# Patient Record
Sex: FEMALE | Race: WHITE | Employment: FULL TIME | URBAN - METROPOLITAN AREA
[De-identification: names, ages, dates, MRNs, and addresses within clinical notes are randomized per-mention and may not be internally consistent; named-entity substitution may affect disease eponyms.]

---

## 2017-04-19 ENCOUNTER — HOSPITAL ENCOUNTER (OUTPATIENT)
Dept: PHARMACY | Age: 47
Setting detail: THERAPIES SERIES
Discharge: HOME | End: 2017-04-19
Payer: COMMERCIAL

## 2017-04-19 LAB
CREAT SERPL-MCNC: 0.84 MG/DL (ref 0.5–0.9)
GFR AFRICAN AMERICAN: >60 ML/MIN
GFR NON-AFRICAN AMERICAN: >60 ML/MIN
GFR SERPL CREATININE-BSD FRML MDRD: NORMAL ML/MIN/{1.73_M2}
GFR SERPL CREATININE-BSD FRML MDRD: NORMAL ML/MIN/{1.73_M2}
HCT VFR BLD CALC: 42.9 % (ref 36–46)
HEMOGLOBIN: 14.4 G/DL (ref 12–16)

## 2017-04-19 PROCEDURE — 82565 ASSAY OF CREATININE: CPT

## 2017-04-19 PROCEDURE — 85014 HEMATOCRIT: CPT

## 2017-04-19 PROCEDURE — 36415 COLL VENOUS BLD VENIPUNCTURE: CPT

## 2017-04-19 PROCEDURE — 85018 HEMOGLOBIN: CPT

## 2017-04-19 PROCEDURE — G0463 HOSPITAL OUTPT CLINIC VISIT: HCPCS

## 2017-04-24 ENCOUNTER — OFFICE VISIT (OUTPATIENT)
Dept: OBGYN CLINIC | Age: 47
End: 2017-04-24
Payer: COMMERCIAL

## 2017-04-24 VITALS
HEIGHT: 62 IN | BODY MASS INDEX: 42.69 KG/M2 | HEART RATE: 78 BPM | DIASTOLIC BLOOD PRESSURE: 84 MMHG | SYSTOLIC BLOOD PRESSURE: 124 MMHG | WEIGHT: 232 LBS

## 2017-04-24 DIAGNOSIS — Z12.39 ENCOUNTER FOR BREAST CANCER SCREENING OTHER THAN MAMMOGRAM: ICD-10-CM

## 2017-04-24 DIAGNOSIS — Z01.419 WELL FEMALE EXAM WITH ROUTINE GYNECOLOGICAL EXAM: Primary | ICD-10-CM

## 2017-04-24 PROCEDURE — 99396 PREV VISIT EST AGE 40-64: CPT | Performed by: NURSE PRACTITIONER

## 2017-04-24 ASSESSMENT — PATIENT HEALTH QUESTIONNAIRE - PHQ9
2. FEELING DOWN, DEPRESSED OR HOPELESS: 0
1. LITTLE INTEREST OR PLEASURE IN DOING THINGS: 0
SUM OF ALL RESPONSES TO PHQ9 QUESTIONS 1 & 2: 0
SUM OF ALL RESPONSES TO PHQ QUESTIONS 1-9: 0

## 2017-07-19 ENCOUNTER — HOSPITAL ENCOUNTER (OUTPATIENT)
Dept: PHARMACY | Age: 47
Setting detail: THERAPIES SERIES
Discharge: HOME | End: 2017-07-19
Payer: COMMERCIAL

## 2017-07-19 DIAGNOSIS — I82.4Z9 DEEP VEIN THROMBOSIS (DVT) OF DISTAL VEIN OF LOWER EXTREMITY, UNSPECIFIED CHRONICITY, UNSPECIFIED LATERALITY (HCC): ICD-10-CM

## 2017-07-19 LAB
CREAT SERPL-MCNC: 0.76 MG/DL (ref 0.5–0.9)
GFR AFRICAN AMERICAN: >60 ML/MIN
GFR NON-AFRICAN AMERICAN: >60 ML/MIN
GFR SERPL CREATININE-BSD FRML MDRD: NORMAL ML/MIN/{1.73_M2}
GFR SERPL CREATININE-BSD FRML MDRD: NORMAL ML/MIN/{1.73_M2}
HCT VFR BLD CALC: 44.8 % (ref 36–46)
HEMOGLOBIN: 15 G/DL (ref 12–16)

## 2017-07-19 PROCEDURE — 99211 OFF/OP EST MAY X REQ PHY/QHP: CPT

## 2017-07-19 PROCEDURE — 36415 COLL VENOUS BLD VENIPUNCTURE: CPT

## 2017-07-19 PROCEDURE — 82565 ASSAY OF CREATININE: CPT

## 2017-07-19 PROCEDURE — 85018 HEMOGLOBIN: CPT

## 2017-07-19 PROCEDURE — 85014 HEMATOCRIT: CPT

## 2017-08-11 ENCOUNTER — HOSPITAL ENCOUNTER (OUTPATIENT)
Dept: WOMENS IMAGING | Age: 47
Discharge: HOME | End: 2017-08-11
Payer: COMMERCIAL

## 2017-08-11 DIAGNOSIS — Z12.39 ENCOUNTER FOR BREAST CANCER SCREENING OTHER THAN MAMMOGRAM: ICD-10-CM

## 2017-08-11 DIAGNOSIS — Z01.419 WELL FEMALE EXAM WITH ROUTINE GYNECOLOGICAL EXAM: ICD-10-CM

## 2017-08-11 PROCEDURE — 77063 BREAST TOMOSYNTHESIS BI: CPT

## 2017-10-18 ENCOUNTER — HOSPITAL ENCOUNTER (OUTPATIENT)
Dept: PHARMACY | Age: 47
Setting detail: THERAPIES SERIES
Discharge: HOME | End: 2017-10-18
Payer: COMMERCIAL

## 2017-10-18 DIAGNOSIS — I82.4Z9 DEEP VEIN THROMBOSIS (DVT) OF DISTAL VEIN OF LOWER EXTREMITY, UNSPECIFIED CHRONICITY, UNSPECIFIED LATERALITY (HCC): ICD-10-CM

## 2017-10-18 LAB
CREAT SERPL-MCNC: 0.76 MG/DL (ref 0.5–0.9)
GFR AFRICAN AMERICAN: >60 ML/MIN
GFR NON-AFRICAN AMERICAN: >60 ML/MIN
GFR SERPL CREATININE-BSD FRML MDRD: NORMAL ML/MIN/{1.73_M2}
GFR SERPL CREATININE-BSD FRML MDRD: NORMAL ML/MIN/{1.73_M2}
HCT VFR BLD CALC: 44.3 % (ref 36–46)
HEMOGLOBIN: 15.3 G/DL (ref 12–16)

## 2017-10-18 PROCEDURE — 85014 HEMATOCRIT: CPT

## 2017-10-18 PROCEDURE — 85018 HEMOGLOBIN: CPT

## 2017-10-18 PROCEDURE — 99211 OFF/OP EST MAY X REQ PHY/QHP: CPT

## 2017-10-18 PROCEDURE — 82565 ASSAY OF CREATININE: CPT

## 2017-10-18 PROCEDURE — 36415 COLL VENOUS BLD VENIPUNCTURE: CPT

## 2018-04-18 ENCOUNTER — HOSPITAL ENCOUNTER (OUTPATIENT)
Age: 48
Discharge: HOME | End: 2018-04-18
Payer: COMMERCIAL

## 2018-04-18 ENCOUNTER — HOSPITAL ENCOUNTER (OUTPATIENT)
Dept: PHARMACY | Age: 48
Setting detail: THERAPIES SERIES
Discharge: HOME | End: 2018-04-18
Payer: COMMERCIAL

## 2018-04-18 LAB
CREAT SERPL-MCNC: 0.87 MG/DL (ref 0.5–0.9)
GFR AFRICAN AMERICAN: >60 ML/MIN
GFR NON-AFRICAN AMERICAN: >60 ML/MIN
GFR SERPL CREATININE-BSD FRML MDRD: NORMAL ML/MIN/{1.73_M2}
GFR SERPL CREATININE-BSD FRML MDRD: NORMAL ML/MIN/{1.73_M2}
HCT VFR BLD CALC: 43.8 % (ref 36–46)
HEMOGLOBIN: 14.7 G/DL (ref 12–16)
MCH RBC QN AUTO: 29.3 PG (ref 26–34)
MCHC RBC AUTO-ENTMCNC: 33.5 G/DL (ref 31–37)
MCV RBC AUTO: 87.5 FL (ref 80–100)
NRBC AUTOMATED: NORMAL PER 100 WBC
PDW BLD-RTO: 13.7 % (ref 11.5–14.9)
PLATELET # BLD: 306 K/UL (ref 150–450)
PMV BLD AUTO: 7.4 FL (ref 6–12)
RBC # BLD: 5.01 M/UL (ref 4–5.2)
WBC # BLD: 8 K/UL (ref 3.5–11)

## 2018-04-18 PROCEDURE — 85027 COMPLETE CBC AUTOMATED: CPT

## 2018-04-18 PROCEDURE — 99211 OFF/OP EST MAY X REQ PHY/QHP: CPT

## 2018-04-18 PROCEDURE — 36415 COLL VENOUS BLD VENIPUNCTURE: CPT

## 2018-04-18 PROCEDURE — 82565 ASSAY OF CREATININE: CPT

## 2018-04-25 ENCOUNTER — OFFICE VISIT (OUTPATIENT)
Dept: OBGYN CLINIC | Age: 48
End: 2018-04-25
Payer: COMMERCIAL

## 2018-04-25 ENCOUNTER — HOSPITAL ENCOUNTER (OUTPATIENT)
Age: 48
Setting detail: SPECIMEN
Discharge: HOME | End: 2018-04-25
Payer: COMMERCIAL

## 2018-04-25 VITALS
SYSTOLIC BLOOD PRESSURE: 112 MMHG | DIASTOLIC BLOOD PRESSURE: 70 MMHG | WEIGHT: 232 LBS | BODY MASS INDEX: 42.69 KG/M2 | HEIGHT: 62 IN

## 2018-04-25 DIAGNOSIS — Z01.419 WELL WOMAN EXAM: Primary | ICD-10-CM

## 2018-04-25 DIAGNOSIS — Z11.51 SPECIAL SCREENING EXAMINATION FOR HUMAN PAPILLOMAVIRUS (HPV): ICD-10-CM

## 2018-04-25 DIAGNOSIS — N89.8 VAGINAL DISCHARGE: ICD-10-CM

## 2018-04-25 DIAGNOSIS — Z12.39 SCREENING FOR BREAST CANCER: ICD-10-CM

## 2018-04-25 PROBLEM — Z98.890 HISTORY OF ENDOMETRIAL ABLATION: Status: ACTIVE | Noted: 2018-04-25

## 2018-04-25 PROCEDURE — 87070 CULTURE OTHR SPECIMN AEROBIC: CPT

## 2018-04-25 PROCEDURE — G0145 SCR C/V CYTO,THINLAYER,RESCR: HCPCS

## 2018-04-25 PROCEDURE — 99396 PREV VISIT EST AGE 40-64: CPT | Performed by: NURSE PRACTITIONER

## 2018-04-25 PROCEDURE — 87624 HPV HI-RISK TYP POOLED RSLT: CPT

## 2018-04-25 PROCEDURE — 86403 PARTICLE AGGLUT ANTBDY SCRN: CPT

## 2018-04-25 ASSESSMENT — PATIENT HEALTH QUESTIONNAIRE - PHQ9
SUM OF ALL RESPONSES TO PHQ QUESTIONS 1-9: 0
2. FEELING DOWN, DEPRESSED OR HOPELESS: 0
1. LITTLE INTEREST OR PLEASURE IN DOING THINGS: 0
SUM OF ALL RESPONSES TO PHQ9 QUESTIONS 1 & 2: 0

## 2018-04-27 LAB
HPV SAMPLE: ABNORMAL
HPV SOURCE: ABNORMAL
HPV, GENOTYPE 16: NOT DETECTED
HPV, GENOTYPE 18: NOT DETECTED
HPV, HIGH RISK OTHER: DETECTED
HPV, INTERPRETATION: ABNORMAL

## 2018-04-28 LAB
CULTURE: ABNORMAL
Lab: ABNORMAL
SPECIMEN DESCRIPTION: ABNORMAL
SPECIMEN DESCRIPTION: ABNORMAL
STATUS: ABNORMAL

## 2018-05-18 LAB — CYTOLOGY REPORT: NORMAL

## 2018-05-23 ENCOUNTER — TELEPHONE (OUTPATIENT)
Dept: OBGYN CLINIC | Age: 48
End: 2018-05-23

## 2018-05-24 ENCOUNTER — TELEPHONE (OUTPATIENT)
Dept: OBGYN CLINIC | Age: 48
End: 2018-05-24

## 2018-05-24 RX ORDER — METRONIDAZOLE 500 MG/1
500 TABLET ORAL 2 TIMES DAILY
Qty: 14 TABLET | Refills: 0 | Status: SHIPPED | OUTPATIENT
Start: 2018-05-24 | End: 2018-05-31

## 2018-08-03 ENCOUNTER — PROCEDURE VISIT (OUTPATIENT)
Dept: OBGYN CLINIC | Age: 48
End: 2018-08-03
Payer: COMMERCIAL

## 2018-08-03 ENCOUNTER — HOSPITAL ENCOUNTER (OUTPATIENT)
Age: 48
Setting detail: SPECIMEN
Discharge: HOME | End: 2018-08-03
Payer: COMMERCIAL

## 2018-08-03 VITALS
WEIGHT: 232 LBS | SYSTOLIC BLOOD PRESSURE: 118 MMHG | DIASTOLIC BLOOD PRESSURE: 82 MMHG | BODY MASS INDEX: 42.69 KG/M2 | HEART RATE: 78 BPM | HEIGHT: 62 IN

## 2018-08-03 DIAGNOSIS — R87.810 ASCUS WITH POSITIVE HIGH RISK HPV CERVICAL: Primary | ICD-10-CM

## 2018-08-03 DIAGNOSIS — R87.610 ATYPICAL SQUAMOUS CELLS OF UNDETERMINED SIGNIFICANCE ON CYTOLOGIC SMEAR OF CERVIX (ASC-US): ICD-10-CM

## 2018-08-03 DIAGNOSIS — R87.610 ASCUS WITH POSITIVE HIGH RISK HPV CERVICAL: Primary | ICD-10-CM

## 2018-08-03 DIAGNOSIS — Z92.29 HX OF LONG TERM USE OF BLOOD THINNERS: ICD-10-CM

## 2018-08-03 PROCEDURE — 88305 TISSUE EXAM BY PATHOLOGIST: CPT

## 2018-08-03 PROCEDURE — 57454 BX/CURETT OF CERVIX W/SCOPE: CPT | Performed by: OBSTETRICS & GYNECOLOGY

## 2018-08-03 NOTE — PROGRESS NOTES
[id number] [facility] Gynecology    COLPOSCOPY PROCEDURE FORM    Chief Complaint:   Chief Complaint   Patient presents with    Procedure         8/3/2018  Maxwell Russell  No LMP recorded. Patient has had an ablation. 53 y.o.  [id number]    Past Medical History:   Diagnosis Date    Atypical squamous cells of undetermined significance on cytologic smear of cervix (ASC-US) +HPV (other) 2018 8/3/2018    DVT (deep venous thrombosis) (HCC)     left leg    Hx: UTI (urinary tract infection)     Phobia-Closed Spaces 2013         Past Surgical History:   Procedure Laterality Date     SECTION      x2    CHOLECYSTECTOMY      ENDOMETRIAL ABLATION  13    [facility]    TUBAL LIGATION      VENA CAVA FILTER PLACEMENT  2013    WISDOM TOOTH EXTRACTION         Family History   Problem Relation Age of Onset    Diabetes Mother     Diabetes Father     Breast Cancer Maternal Aunt         dx after age 48    Diabetes Maternal Uncle     Diabetes Maternal Grandmother     Diabetes Maternal Uncle     Heart Attack Paternal Grandmother     Heart Attack Paternal Grandfather     Cancer Neg Hx     Colon Cancer Neg Hx     Eclampsia Neg Hx     Hypertension Neg Hx     Ovarian Cancer Neg Hx      Labor Neg Hx     Spont Abortions Neg Hx     Stroke Neg Hx        Social History     Social History    Marital status: Single     Spouse name: N/A    Number of children: N/A    Years of education: N/A     Occupational History    Not on file.      Social History Main Topics    Smoking status: Current Every Day Smoker     Packs/day: 0.50     Years: 20.00     Types: Cigarettes    Smokeless tobacco: Never Used      Comment: trying to quit    Alcohol use 0.0 oz/week      Comment: Occassional    Drug use: No    Sexual activity: Not Currently     Partners: Male     Birth control/ protection: Surgical      Comment: tubal     Other Topics Concern    Not on file     Social History Narrative    No narrative on file       Current Outpatient Prescriptions on File Prior to Visit   Medication Sig Dispense Refill    apixaban (ELIQUIS) 2.5 MG TABS tablet Take 2.5 mg by mouth 2 times daily       No current facility-administered medications on file prior to visit. Allergies as of 08/03/2018    (No Known Allergies)           INDICATIONS:   1. ASCUS with positive high risk HPV cervical    2. Atypical squamous cells of undetermined significance on cytologic smear of cervix (ASC-US) +HPV (other) 4/2018    3. Hx of long term use of blood thinners                   UHCG: negative         HPV:   +Other (Negative 16 & 18)      Birth Control Method: TL  Abnormal Cytology and Colposcopy History: NONE    COLPOSCOPIC EXAMINATION:                Blood pressure 118/82, pulse 78, height 5' 2\" (1.575 m), weight 232 lb (105.2 kg), not currently breastfeeding. Gross observations: negative  Satisfactory: No   Unsatisfactory: Yes              LANDMARKS and ATYPICAL FINDINGS:  TZ = transformation zone   SC = new squamocolumnar junction  NC = Nabothian cyst    ME = immature squamous metaplasia  PO = polyp   AV = atypical vessels  C = condyloma  L = leukoplakia  AW = acetowhite epithelium   P = punctation  MO = mosaicism   LS = decreased Lugols uptake  X = biopsy sites  CA = invasive carcinoma      FINDINGS: The colposcope was utilized on high and low magnification. A plain white light and green filter were  also implemented after 3% of acetic acid was applied to the cervix. There was no Aceto White Epithelium, Mosaic Changes, Punctation, or Irregular Vessels seen. ECC performed:  Yes    Lugols Iodine Applied:   No       IMPRESSIONS: Negative  Biopsy sites: ECC      Assessment:   Diagnosis Orders   1. ASCUS with positive high risk HPV cervical  [place] COLPOSC,CERVIX W/ADJ VAG,W/BX & CURRETAG    Specimen to Pathology Outpatient   2.  Atypical squamous cells of undetermined significance on cytologic smear of cervix (ASC-US) +HPV (other) 4/2018     3. Hx of long term use of blood thinners           PLAN:   1. The patient was given formal restriction guidelines. She is to RTO in 2 weeks. FOR PATIENTS WHO UNDERWENT A BIOPSY-They were instructed to report         any increase in vaginal bleeding, discharge, or any temperature more than      100.4   F. Strict pelvic rest precautions were reviewed with lifting restrictions. 2. If WNL Histopathology or Negative Colposcopic evaluation without Biopsies           and/or ECC then Cytologic Collection/PAP in 6 months. 3. HPV Barrier Recommendations and STD counseling completed    Return in about 2 months (around 10/3/2018) for PAP.       Latanya Holley, DO

## 2018-08-08 LAB — SURGICAL PATHOLOGY REPORT: NORMAL

## 2018-08-13 ENCOUNTER — HOSPITAL ENCOUNTER (OUTPATIENT)
Dept: WOMENS IMAGING | Age: 48
Discharge: HOME | End: 2018-08-15
Payer: COMMERCIAL

## 2018-08-13 DIAGNOSIS — Z12.39 SCREENING FOR BREAST CANCER: ICD-10-CM

## 2018-08-13 PROCEDURE — 77063 BREAST TOMOSYNTHESIS BI: CPT

## 2018-10-05 ENCOUNTER — HOSPITAL ENCOUNTER (OUTPATIENT)
Age: 48
Setting detail: SPECIMEN
Discharge: HOME | End: 2018-10-05
Payer: COMMERCIAL

## 2018-10-05 ENCOUNTER — OFFICE VISIT (OUTPATIENT)
Dept: OBGYN CLINIC | Age: 48
End: 2018-10-05
Payer: COMMERCIAL

## 2018-10-05 VITALS
HEIGHT: 62 IN | WEIGHT: 232 LBS | SYSTOLIC BLOOD PRESSURE: 112 MMHG | HEART RATE: 76 BPM | DIASTOLIC BLOOD PRESSURE: 74 MMHG | BODY MASS INDEX: 42.69 KG/M2

## 2018-10-05 DIAGNOSIS — R87.810 ASCUS WITH POSITIVE HIGH RISK HPV CERVICAL: ICD-10-CM

## 2018-10-05 DIAGNOSIS — Z11.51 SPECIAL SCREENING EXAMINATION FOR HUMAN PAPILLOMAVIRUS (HPV): ICD-10-CM

## 2018-10-05 DIAGNOSIS — Z11.51 SPECIAL SCREENING EXAMINATION FOR HUMAN PAPILLOMAVIRUS (HPV): Primary | ICD-10-CM

## 2018-10-05 DIAGNOSIS — R87.610 ASCUS WITH POSITIVE HIGH RISK HPV CERVICAL: ICD-10-CM

## 2018-10-05 DIAGNOSIS — Z01.419 WELL WOMAN EXAM: ICD-10-CM

## 2018-10-05 PROCEDURE — 88175 CYTOPATH C/V AUTO FLUID REDO: CPT

## 2018-10-05 PROCEDURE — 99213 OFFICE O/P EST LOW 20 MIN: CPT | Performed by: NURSE PRACTITIONER

## 2018-10-05 PROCEDURE — 87624 HPV HI-RISK TYP POOLED RSLT: CPT

## 2018-10-17 ENCOUNTER — HOSPITAL ENCOUNTER (OUTPATIENT)
Dept: PHARMACY | Age: 48
Setting detail: THERAPIES SERIES
Discharge: HOME | End: 2018-10-17
Payer: COMMERCIAL

## 2018-10-17 ENCOUNTER — HOSPITAL ENCOUNTER (OUTPATIENT)
Age: 48
Setting detail: SPECIMEN
Discharge: HOME | End: 2018-10-17
Payer: COMMERCIAL

## 2018-10-17 ENCOUNTER — TELEPHONE (OUTPATIENT)
Dept: PHARMACY | Age: 48
End: 2018-10-17

## 2018-10-17 DIAGNOSIS — I82.403 DEEP VEIN THROMBOSIS (DVT) OF BOTH LOWER EXTREMITIES, UNSPECIFIED CHRONICITY, UNSPECIFIED VEIN (HCC): ICD-10-CM

## 2018-10-17 LAB
HCT VFR BLD CALC: 45.3 % (ref 36–46)
HEMOGLOBIN: 15 G/DL (ref 12–16)

## 2018-10-17 PROCEDURE — 85018 HEMOGLOBIN: CPT

## 2018-10-17 PROCEDURE — 36415 COLL VENOUS BLD VENIPUNCTURE: CPT

## 2018-10-17 PROCEDURE — 99212 OFFICE O/P EST SF 10 MIN: CPT

## 2018-10-17 PROCEDURE — 85014 HEMATOCRIT: CPT

## 2018-10-23 LAB — CYTOLOGY REPORT: NORMAL

## 2018-10-25 ENCOUNTER — TELEPHONE (OUTPATIENT)
Dept: OBGYN CLINIC | Age: 48
End: 2018-10-25

## 2018-10-25 DIAGNOSIS — R87.618 UNEXPLAINED ENDOMETRIAL CELLS ON CERVICAL PAP SMEAR: Primary | ICD-10-CM

## 2018-11-05 ENCOUNTER — OFFICE VISIT (OUTPATIENT)
Dept: OBGYN CLINIC | Age: 48
End: 2018-11-05

## 2018-11-05 DIAGNOSIS — R87.618 UNEXPLAINED ENDOMETRIAL CELLS ON CERVICAL PAP SMEAR: ICD-10-CM

## 2018-11-05 DIAGNOSIS — R87.618 UNEXPLAINED ENDOMETRIAL CELLS ON CERVICAL PAP SMEAR: Primary | ICD-10-CM

## 2018-11-05 PROCEDURE — 76856 US EXAM PELVIC COMPLETE: CPT | Performed by: OBSTETRICS & GYNECOLOGY

## 2018-11-05 PROCEDURE — 76830 TRANSVAGINAL US NON-OB: CPT | Performed by: OBSTETRICS & GYNECOLOGY

## 2018-12-06 ENCOUNTER — PROCEDURE VISIT (OUTPATIENT)
Dept: OBGYN CLINIC | Age: 48
End: 2018-12-06
Payer: COMMERCIAL

## 2018-12-06 ENCOUNTER — HOSPITAL ENCOUNTER (OUTPATIENT)
Age: 48
Discharge: HOME | End: 2018-12-06
Payer: COMMERCIAL

## 2018-12-06 VITALS
BODY MASS INDEX: 42.69 KG/M2 | SYSTOLIC BLOOD PRESSURE: 116 MMHG | WEIGHT: 232 LBS | DIASTOLIC BLOOD PRESSURE: 80 MMHG | HEART RATE: 78 BPM | HEIGHT: 62 IN

## 2018-12-06 DIAGNOSIS — N95.1 PERIMENOPAUSAL SYMPTOMS: ICD-10-CM

## 2018-12-06 DIAGNOSIS — Z32.02 NEGATIVE PREGNANCY TEST: ICD-10-CM

## 2018-12-06 DIAGNOSIS — R87.618 UNEXPLAINED ENDOMETRIAL CELLS ON CERVICAL PAP SMEAR: ICD-10-CM

## 2018-12-06 DIAGNOSIS — R87.810 ASCUS WITH POSITIVE HIGH RISK HPV CERVICAL: Primary | ICD-10-CM

## 2018-12-06 DIAGNOSIS — R87.610 ASCUS WITH POSITIVE HIGH RISK HPV CERVICAL: Primary | ICD-10-CM

## 2018-12-06 LAB
ABSOLUTE EOS #: 0.2 K/UL (ref 0–0.4)
ABSOLUTE IMMATURE GRANULOCYTE: NORMAL K/UL (ref 0–0.3)
ABSOLUTE LYMPH #: 2.1 K/UL (ref 1–4.8)
ABSOLUTE MONO #: 0.6 K/UL (ref 0.1–1.3)
BASOPHILS # BLD: 1 % (ref 0–2)
BASOPHILS ABSOLUTE: 0.1 K/UL (ref 0–0.2)
CONTROL: NORMAL
DIFFERENTIAL TYPE: NORMAL
EOSINOPHILS RELATIVE PERCENT: 2 % (ref 0–4)
ESTRADIOL LEVEL: 461 PG/ML (ref 27–314)
FOLLICLE STIMULATING HORMONE: 15.8 U/L (ref 1.7–21.5)
HCT VFR BLD CALC: 44 % (ref 36–46)
HEMOGLOBIN: 14.9 G/DL (ref 12–16)
IMMATURE GRANULOCYTES: NORMAL %
LYMPHOCYTES # BLD: 24 % (ref 24–44)
MCH RBC QN AUTO: 29.5 PG (ref 26–34)
MCHC RBC AUTO-ENTMCNC: 33.9 G/DL (ref 31–37)
MCV RBC AUTO: 87 FL (ref 80–100)
MONOCYTES # BLD: 7 % (ref 1–7)
NRBC AUTOMATED: NORMAL PER 100 WBC
PDW BLD-RTO: 13.7 % (ref 11.5–14.9)
PLATELET # BLD: 304 K/UL (ref 150–450)
PLATELET ESTIMATE: NORMAL
PMV BLD AUTO: 8.7 FL (ref 6–12)
PREGNANCY TEST URINE, POC: NEGATIVE
RBC # BLD: 5.05 M/UL (ref 4–5.2)
RBC # BLD: NORMAL 10*6/UL
SEG NEUTROPHILS: 66 % (ref 36–66)
SEGMENTED NEUTROPHILS ABSOLUTE COUNT: 5.7 K/UL (ref 1.3–9.1)
TSH SERPL DL<=0.05 MIU/L-ACNC: 1.24 MIU/L (ref 0.3–5)
WBC # BLD: 8.6 K/UL (ref 3.5–11)
WBC # BLD: NORMAL 10*3/UL

## 2018-12-06 PROCEDURE — 83001 ASSAY OF GONADOTROPIN (FSH): CPT

## 2018-12-06 PROCEDURE — 84443 ASSAY THYROID STIM HORMONE: CPT

## 2018-12-06 PROCEDURE — 57456 ENDOCERV CURETTAGE W/SCOPE: CPT | Performed by: OBSTETRICS & GYNECOLOGY

## 2018-12-06 PROCEDURE — 81025 URINE PREGNANCY TEST: CPT | Performed by: OBSTETRICS & GYNECOLOGY

## 2018-12-06 PROCEDURE — 36415 COLL VENOUS BLD VENIPUNCTURE: CPT

## 2018-12-06 PROCEDURE — 82670 ASSAY OF TOTAL ESTRADIOL: CPT

## 2018-12-06 PROCEDURE — 85025 COMPLETE CBC W/AUTO DIFF WBC: CPT

## 2018-12-06 PROCEDURE — 88305 TISSUE EXAM BY PATHOLOGIST: CPT

## 2018-12-06 NOTE — PROGRESS NOTES
[id number] [facility] Gynecology    COLPOSCOPY PROCEDURE FORM    Chief Complaint:   Chief Complaint   Patient presents with    Procedure         2018  Elva Russell  No LMP recorded. Patient has had an ablation. 53 y.o.  [id number]    Past Medical History:   Diagnosis Date    Atypical squamous cells of undetermined significance on cytologic smear of cervix (ASC-US) +HPV (other) 2018 8/3/2018    DVT (deep venous thrombosis) (HCC)     left leg    Hx: UTI (urinary tract infection)     Phobia-Closed Spaces 2013         Past Surgical History:   Procedure Laterality Date     SECTION      x2    CHOLECYSTECTOMY      ENDOMETRIAL ABLATION  13    [facility]    TUBAL LIGATION      VENA CAVA FILTER PLACEMENT  2013    WISDOM TOOTH EXTRACTION         Family History   Problem Relation Age of Onset    Diabetes Mother     Diabetes Father     Breast Cancer Maternal Aunt         dx after age 48    Diabetes Maternal Uncle     Diabetes Maternal Grandmother     Diabetes Maternal Uncle     Heart Attack Paternal Grandmother     Heart Attack Paternal Grandfather     Cancer Neg Hx     Colon Cancer Neg Hx     Eclampsia Neg Hx     Hypertension Neg Hx     Ovarian Cancer Neg Hx      Labor Neg Hx     Spont Abortions Neg Hx     Stroke Neg Hx        Social History     Social History    Marital status: Single     Spouse name: N/A    Number of children: N/A    Years of education: N/A     Occupational History    Not on file.      Social History Main Topics    Smoking status: Current Every Day Smoker     Packs/day: 0.50     Years: 20.00     Types: Cigarettes    Smokeless tobacco: Never Used      Comment: trying to quit    Alcohol use 0.0 oz/week      Comment: Occassional    Drug use: No    Sexual activity: Not Currently     Partners: Male     Birth control/ protection: Surgical      Comment: tubal     Other Topics Concern    Not on file     Social History Narrative

## 2018-12-10 LAB — SURGICAL PATHOLOGY REPORT: NORMAL

## 2018-12-13 ENCOUNTER — TELEPHONE (OUTPATIENT)
Dept: OBGYN CLINIC | Age: 48
End: 2018-12-13

## 2019-01-02 ENCOUNTER — TELEPHONE (OUTPATIENT)
Dept: OBGYN CLINIC | Age: 49
End: 2019-01-02

## 2019-01-10 ENCOUNTER — HOSPITAL ENCOUNTER (OUTPATIENT)
Age: 49
Setting detail: SPECIMEN
Discharge: HOME | End: 2019-01-10
Payer: COMMERCIAL

## 2019-01-10 ENCOUNTER — PROCEDURE VISIT (OUTPATIENT)
Dept: OBGYN CLINIC | Age: 49
End: 2019-01-10
Payer: COMMERCIAL

## 2019-01-10 VITALS
BODY MASS INDEX: 42.69 KG/M2 | DIASTOLIC BLOOD PRESSURE: 80 MMHG | HEIGHT: 62 IN | SYSTOLIC BLOOD PRESSURE: 116 MMHG | HEART RATE: 78 BPM | WEIGHT: 232 LBS

## 2019-01-10 DIAGNOSIS — N94.6 DYSMENORRHEA: ICD-10-CM

## 2019-01-10 DIAGNOSIS — Z32.02 NEGATIVE PREGNANCY TEST: ICD-10-CM

## 2019-01-10 DIAGNOSIS — D68.59 ANTITHROMBIN 3 DEFICIENCY (HCC): ICD-10-CM

## 2019-01-10 DIAGNOSIS — R87.810 ASCUS WITH POSITIVE HIGH RISK HPV CERVICAL: ICD-10-CM

## 2019-01-10 DIAGNOSIS — R87.618 UNEXPLAINED ENDOMETRIAL CELLS ON CERVICAL PAP SMEAR: Primary | ICD-10-CM

## 2019-01-10 DIAGNOSIS — I82.403 DEEP VEIN THROMBOSIS (DVT) OF BOTH LOWER EXTREMITIES, UNSPECIFIED CHRONICITY, UNSPECIFIED VEIN (HCC): ICD-10-CM

## 2019-01-10 DIAGNOSIS — R87.610 ASCUS WITH POSITIVE HIGH RISK HPV CERVICAL: ICD-10-CM

## 2019-01-10 DIAGNOSIS — Z92.29 HX OF LONG TERM USE OF BLOOD THINNERS: ICD-10-CM

## 2019-01-10 DIAGNOSIS — N88.2 CERVICAL STENOSIS (UTERINE CERVIX): ICD-10-CM

## 2019-01-10 DIAGNOSIS — Z95.828 GREENFIELD FILTER IN PLACE: ICD-10-CM

## 2019-01-10 DIAGNOSIS — N89.8 VAGINAL DISCHARGE: ICD-10-CM

## 2019-01-10 LAB
CONTROL: NORMAL
PREGNANCY TEST URINE, POC: NEGATIVE

## 2019-01-10 PROCEDURE — 58558 HYSTEROSCOPY BIOPSY: CPT | Performed by: OBSTETRICS & GYNECOLOGY

## 2019-01-10 PROCEDURE — 87070 CULTURE OTHR SPECIMN AEROBIC: CPT

## 2019-01-10 PROCEDURE — 88305 TISSUE EXAM BY PATHOLOGIST: CPT

## 2019-01-10 PROCEDURE — 81025 URINE PREGNANCY TEST: CPT | Performed by: OBSTETRICS & GYNECOLOGY

## 2019-01-14 LAB — SURGICAL PATHOLOGY REPORT: NORMAL

## 2019-02-26 ENCOUNTER — TELEPHONE (OUTPATIENT)
Dept: OBGYN CLINIC | Age: 49
End: 2019-02-26

## 2019-04-17 ENCOUNTER — HOSPITAL ENCOUNTER (OUTPATIENT)
Dept: PHARMACY | Age: 49
Setting detail: THERAPIES SERIES
Discharge: HOME | End: 2019-04-17
Payer: COMMERCIAL

## 2019-04-17 DIAGNOSIS — I82.403 DEEP VEIN THROMBOSIS (DVT) OF BOTH LOWER EXTREMITIES, UNSPECIFIED CHRONICITY, UNSPECIFIED VEIN (HCC): ICD-10-CM

## 2019-04-17 LAB
CREAT SERPL-MCNC: 0.81 MG/DL (ref 0.5–0.9)
GFR AFRICAN AMERICAN: >60 ML/MIN
GFR NON-AFRICAN AMERICAN: >60 ML/MIN
GFR SERPL CREATININE-BSD FRML MDRD: NORMAL ML/MIN/{1.73_M2}
GFR SERPL CREATININE-BSD FRML MDRD: NORMAL ML/MIN/{1.73_M2}
HCT VFR BLD CALC: 43.9 % (ref 36–46)
HEMOGLOBIN: 14.5 G/DL (ref 12–16)
MCH RBC QN AUTO: 28.9 PG (ref 26–34)
MCHC RBC AUTO-ENTMCNC: 33.1 G/DL (ref 31–37)
MCV RBC AUTO: 87.5 FL (ref 80–100)
NRBC AUTOMATED: NORMAL PER 100 WBC
PDW BLD-RTO: 13.8 % (ref 11.5–14.9)
PLATELET # BLD: 294 K/UL (ref 150–450)
PMV BLD AUTO: 7.8 FL (ref 6–12)
RBC # BLD: 5.02 M/UL (ref 4–5.2)
WBC # BLD: 7.1 K/UL (ref 3.5–11)

## 2019-04-17 PROCEDURE — 85027 COMPLETE CBC AUTOMATED: CPT

## 2019-04-17 PROCEDURE — 82565 ASSAY OF CREATININE: CPT

## 2019-04-17 PROCEDURE — 99212 OFFICE O/P EST SF 10 MIN: CPT

## 2019-04-17 NOTE — PROGRESS NOTES
Apixaban (Eliquis Management)  Indication of Therapy: Reduction in risk of recurrent DVT/PE  Prescribed Eliquis Dose: 2.5 mg Twice daily    Age 53  Srcr 0.96 on 9-  HBG  15  On 10-  HCT   45.3  On 10-    Dose Appropriate: Yes Patient is taking every 12 hours with phone reminder    Current Meds Reviewed  Yes - patient has started Saxenda (liraglutide) for weight loss. Drug Interactions   None  Patient taking medications as prescribed   Yes    Counseling Points:  1. Indication for Apixaban  2. Explanation of apixaban including purpose and mechanism of action  3. Importance of Compliance  4. Dose and directions  5. Side Effects  6. Potential Drug Interactions  7. Signs/symptoms of VTE and stroke  8. Contact prescriber when:     A. Changes made to other medications     B. Signs/Symptoms of VTE/Stroke     C. Uncontrolled bleeding or unusual bruising  9. Discussed smoking cessation. Explained stepped down approach to smoking cessation as opposed to quick stop. Also discussed Chantix and nicotine replacement as well as use of nicotine gum in addition to nicotine patch or Chantix for cravings. Offered help in finding copay help if Chantix is too costly.     Next Appt scheduled in 6 months  CBC, H&H, SCr ordered

## 2019-04-25 ENCOUNTER — OFFICE VISIT (OUTPATIENT)
Dept: OBGYN CLINIC | Age: 49
End: 2019-04-25
Payer: COMMERCIAL

## 2019-04-25 ENCOUNTER — HOSPITAL ENCOUNTER (OUTPATIENT)
Age: 49
Setting detail: SPECIMEN
Discharge: HOME | End: 2019-04-25
Payer: COMMERCIAL

## 2019-04-25 VITALS
DIASTOLIC BLOOD PRESSURE: 70 MMHG | WEIGHT: 210 LBS | BODY MASS INDEX: 38.64 KG/M2 | RESPIRATION RATE: 18 BRPM | HEIGHT: 62 IN | SYSTOLIC BLOOD PRESSURE: 124 MMHG

## 2019-04-25 DIAGNOSIS — Z01.419 WELL WOMAN EXAM WITH ROUTINE GYNECOLOGICAL EXAM: ICD-10-CM

## 2019-04-25 DIAGNOSIS — Z12.39 SCREENING FOR BREAST CANCER: ICD-10-CM

## 2019-04-25 DIAGNOSIS — Z01.419 WELL WOMAN EXAM WITH ROUTINE GYNECOLOGICAL EXAM: Primary | ICD-10-CM

## 2019-04-25 PROCEDURE — 99396 PREV VISIT EST AGE 40-64: CPT | Performed by: NURSE PRACTITIONER

## 2019-04-25 PROCEDURE — 87624 HPV HI-RISK TYP POOLED RSLT: CPT

## 2019-04-25 PROCEDURE — G0145 SCR C/V CYTO,THINLAYER,RESCR: HCPCS

## 2019-04-25 ASSESSMENT — PATIENT HEALTH QUESTIONNAIRE - PHQ9
SUM OF ALL RESPONSES TO PHQ QUESTIONS 1-9: 0
2. FEELING DOWN, DEPRESSED OR HOPELESS: 0
SUM OF ALL RESPONSES TO PHQ QUESTIONS 1-9: 0
1. LITTLE INTEREST OR PLEASURE IN DOING THINGS: 0
SUM OF ALL RESPONSES TO PHQ9 QUESTIONS 1 & 2: 0

## 2019-04-25 NOTE — PROGRESS NOTES
History and Physical  [address], [address] [address], [address] [place] [phone]   Fax [phone]  Theron Jeffery  2019              53 y.o. Chief Complaint   Patient presents with    Annual Exam       No LMP recorded (lmp unknown). Patient has had an ablation. Primary Care Physician: YAIMA Gay - CNP    The patient was seen and examined. She has no chief complaint today and is here for her annual exam.  Her bowels are regular. There are no voiding complaints. She denies any bloating. She denies vaginal discharge and was counseled on STD's and the need for barrier contraception.      HPI : Theron Jeffery is a 53 y.o. female [id number]    Annual exam  No chief complaint  Hx of endometrial ablation-   ________________________________________________________________________  OB History    Para Term  AB Living   4 4 4 0 0 4   SAB TAB Ectopic Molar Multiple Live Births   0 0 0 0 0 4      # Outcome Date GA Lbr Kush/2nd Weight Sex Delivery Anes PTL Lv   4 Term     F    KUNAL   3 Term     F    KUNAL   2 Term     M CS-LTranv   KUNAL   1 Term     F CS-LTranv   KUNAL     Past Medical History:   Diagnosis Date    Atypical squamous cells of undetermined significance on cytologic smear of cervix (ASC-US) +HPV (other) 2018 8/3/2018    DVT [facility]deep venous thrombosis) ([facility])     left leg    Hx: UTI (urinary tract infection)     Phobia-Closed Spaces 2013                                                                   Past Surgical History:   Procedure Laterality Date     SECTION      x2    CHOLECYSTECTOMY      ENDOMETRIAL ABLATION  13    [facility]    TUBAL LIGATION      VENA CAVA FILTER PLACEMENT  2013    WISDOM TOOTH EXTRACTION       Family History   Problem Relation Age of Onset    Diabetes Mother     Diabetes Father     Breast Cancer Maternal Aunt         dx after age 48    Diabetes Maternal Uncle     Diabetes Maternal Grandmother     Diabetes Maternal Uncle     Heart Attack Paternal Grandmother     Heart Attack Paternal Grandfather     Cancer Neg Hx     Colon Cancer Neg Hx     Eclampsia Neg Hx     Hypertension Neg Hx     Ovarian Cancer Neg Hx      Labor Neg Hx     Spont Abortions Neg Hx     Stroke Neg Hx      Social History     Socioeconomic History    Marital status: Single     Spouse name: Not on file    Number of children: Not on file    Years of education: Not on file    Highest education level: Not on file   Occupational History    Not on file   Social Needs    Financial resource strain: Not on file    Food insecurity:     Worry: Not on file     Inability: Not on file    Transportation needs:     Medical: Not on file     Non-medical: Not on file   Tobacco Use    Smoking status: Current Every Day Smoker     Packs/day: 0.50     Years: 20.00     Pack years: 10.00     Types: Cigarettes    Smokeless tobacco: Never Used    Tobacco comment: trying to quit   Substance and Sexual Activity    Alcohol use:  Yes     Alcohol/week: 0.0 oz     Comment: Occassional    Drug use: No    Sexual activity: Not Currently     Partners: Male     Birth control/protection: Surgical     Comment: tubal   Lifestyle    Physical activity:     Days per week: Not on file     Minutes per session: Not on file    Stress: Not on file   Relationships    Social connections:     Talks on phone: Not on file     Gets together: Not on file     Attends Christian service: Not on file     Active member of club or organization: Not on file     Attends meetings of clubs or organizations: Not on file     Relationship status: Not on file    Intimate partner violence:     Fear of current or ex partner: Not on file     Emotionally abused: Not on file     Physically abused: Not on file     Forced sexual activity: Not on file   Other Topics Concern    Not on file   Social History Narrative    Not on file [Shotty] [Tender] [Enlarged]     Neck and EENT:  The neck was supple. There were no masses   The thyroid was not enlarged and had no masses. Perrla, EOMI B/L, TMI B/L No Abnormalities. Throat inspected-No exudates or Masses, Nares Patent No Masses        Respiratory: The lungs were auscultated and found to be clear. There were no rales, rhonchi or wheezes. There was a good respiratory effort. Cardiovascular: The heart was in a regular rate and rhythm. . No S3 or S4. There was no murmur appreciated. Location, grade, and radiation are not applicable. Extremities: The patients extremities were without calf tenderness, edema, or varicosities. There was full range of motion in all four extremities. Pulses in all four extremities were appreciated and are 2/4. Abdomen: The abdomen was soft and non-tender. There were good bowel sounds in all quadrants and there was no guarding, rebound or rigidity. On evaluation there was no evidence of hepatosplenomegaly and there was no costal vertebral alison tenderness bilaterally. No hernias were appreciated. Abdominal Scars: C/S scar and TL scar, cholecystectomy scar    Psych: The patient had a normal Orientation to: Time, Place, Person, and Situation  There is no Mood / Affect changes    Breast:  (Chest)  normal appearance, no masses or tenderness  Self breast exams were reviewed in detail. Literature was given. Pelvic Exam:  Vulva and vagina appear normal. Bimanual exam reveals normal uterus and adnexa. Rectal Exam:  exam declined by patient          Musculosk:  Normal Gait and station was noted. Digits were evaluated without abnormal findings. Range of motion, stability and strength were evaluated and found to be appropriate for the patients age. ASSESSMENT:      53 y.o. Annual   Diagnosis Orders   1. Well woman exam with routine gynecological exam  PAP SMEAR   2.  Screening for breast cancer  GUMARO DIGITAL SCREEN W CAD BILATERAL Chief Complaint   Patient presents with    Annual Exam          Past Medical History:   Diagnosis Date    Atypical squamous cells of undetermined significance on cytologic smear of cervix (ASC-US) +HPV (other) 4/2018 8/3/2018    DVT (deep venous thrombosis) (HCC)     left leg    Hx: UTI (urinary tract infection)     Phobia-Closed Spaces 5/28/2013         Patient Active Problem List    Diagnosis Date Noted    ASCUS with positive high risk HPV cervical 08/03/2018     Priority: High     8/2018 Colposcopy completed with ECC      Hx of long term use of blood thinners (Eloquis) 08/03/2018     Priority: High    Heavy menstrual bleeding due to blood thinners 08/08/2013     Priority: High    Dysmenorrhea 08/08/2013     Priority: High    Thickened endometrium 06/24/2013     Priority: High     Endometrial Sampling and hysteroscopy 7/2013 (Negative Atypia or Hyperplasia)      Enlarged uterus 06/24/2013     Priority: High    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 Tobacco & Secondary smoke risks reviewed; instructed on cessation and avoidance      Counseling Completed:  Preventative Health Recommendations and Follow up. The patient was informed of the recommended preventative health recommendations. 1. Annuals every year; Cytology collections per prevailing guidelines.    2. Mammograms begin every year at 35 yo if no abnormalities are found and no family     History. 3. Bone density studies every 2-3 years. Begin at 71 yo. If no fracture history or osteoporosis family history. (significant). 4. Colonoscopy begin at 38 yo. Repeat every ten years if negative and no family history. 5. Calcium of 1639-2060 mg/day in split dosing  6. Vitamin D 400-800 IU/day  7. All other preventative health recommendations will be managed by the patients Primary care physician. PLAN:  Return in about 6 months (around 10/25/2019) for 6 month pap smear. Pap smear collected. Mammogram ordered. Repeat Annual every 1 year  Cervical Cytology Evaluation begins at 24years old. If Negative Cytology, Follow-up screening per current guidelines. Mammograms every 1 year. If 35 yo and last mammogram was negative. Calcium and Vitamin D dosing reviewed. Colonoscopy screening reviewed as well as onset for bone density testing. Birth control and barrier recommendations discussed. STD counseling and prevention reviewed. Gardisil counseling completed for all patients 10-35 yo. Routine health maintenance per patients PCP. Orders Placed This Encounter   Procedures    GUMARO DIGITAL SCREEN W CAD BILATERAL     Standing Status:   Future     Standing Expiration Date:   2020     Order Specific Question:   Reason for exam:     Answer:   SCREENING    PAP SMEAR     Patient History:    No LMP recorded (lmp unknown). Patient has had an ablation.   OBGYN Status: Ablation  Past Surgical History:  No date:  SECTION      Comment:  x2  : CHOLECYSTECTOMY  13: ENDOMETRIAL ABLATION      Comment:  Maday Isabel  No date: TUBAL LIGATION  2013: VENA CAVA FILTER PLACEMENT  No date: WISDOM TOOTH EXTRACTION      Social History    Tobacco Use      Smoking status: Current Every Day Smoker        Packs/day: 0.50        Years: 20.00        Pack years: 10        Types: Cigarettes      Smokeless tobacco: Never Used      Tobacco comment: trying to quit       Standing Status:   Future     Number of Occurrences:   1     Standing Expiration Date:   4/24/2020     Order Specific Question:   Collection Type     Answer: Thin Prep     Order Specific Question:   Prior Abnormal Pap Test     Answer:   No     Order Specific Question:   Screening or Diagnostic     Answer:   Screening     Order Specific Question:   HPV Requested?      Answer:   Yes     Order Specific Question:   High Risk Patient     Answer:   N/A

## 2019-04-29 LAB
HPV SAMPLE: ABNORMAL
HPV, GENOTYPE 16: NOT DETECTED
HPV, GENOTYPE 18: NOT DETECTED
HPV, HIGH RISK OTHER: DETECTED
HPV, INTERPRETATION: ABNORMAL
SPECIMEN DESCRIPTION: ABNORMAL

## 2019-05-01 LAB — CYTOLOGY REPORT: NORMAL

## 2019-05-02 ENCOUNTER — TELEPHONE (OUTPATIENT)
Dept: OBGYN CLINIC | Age: 49
End: 2019-05-02

## 2019-05-02 NOTE — TELEPHONE ENCOUNTER
Reviewed results with Francisco Rosario, he recommend patient have a 6 month pap smear completed, patient notified of results and recommendations. Patient is scheduled for 6 month pap smear to be completed in October.

## 2019-05-02 NOTE — TELEPHONE ENCOUNTER
----- Message from YAIMA Demarco CNP sent at 5/1/2019  5:09 PM EDT -----  Pap smear neg  + HPV detected  AGE 53  Had colposcopy with DR Jackie Irene 12/2018  Please check with Dr Jackie Irene if she need another colposcopy

## 2019-08-15 ENCOUNTER — HOSPITAL ENCOUNTER (OUTPATIENT)
Dept: WOMENS IMAGING | Age: 49
Discharge: HOME | End: 2019-08-17
Payer: COMMERCIAL

## 2019-08-15 DIAGNOSIS — Z12.39 SCREENING FOR BREAST CANCER: ICD-10-CM

## 2019-08-15 PROCEDURE — 77063 BREAST TOMOSYNTHESIS BI: CPT

## 2019-10-23 ENCOUNTER — HOSPITAL ENCOUNTER (OUTPATIENT)
Dept: PHARMACY | Age: 49
Setting detail: THERAPIES SERIES
Discharge: HOME | End: 2019-10-23
Payer: COMMERCIAL

## 2019-10-23 ENCOUNTER — OFFICE VISIT (OUTPATIENT)
Dept: OBGYN CLINIC | Age: 49
End: 2019-10-23
Payer: COMMERCIAL

## 2019-10-23 ENCOUNTER — HOSPITAL ENCOUNTER (OUTPATIENT)
Age: 49
Setting detail: SPECIMEN
Discharge: HOME | End: 2019-10-23
Payer: COMMERCIAL

## 2019-10-23 VITALS
DIASTOLIC BLOOD PRESSURE: 88 MMHG | HEIGHT: 62 IN | BODY MASS INDEX: 41.59 KG/M2 | SYSTOLIC BLOOD PRESSURE: 132 MMHG | WEIGHT: 226 LBS

## 2019-10-23 DIAGNOSIS — I82.403 DEEP VEIN THROMBOSIS (DVT) OF BOTH LOWER EXTREMITIES, UNSPECIFIED CHRONICITY, UNSPECIFIED VEIN (HCC): ICD-10-CM

## 2019-10-23 DIAGNOSIS — R87.810 ASCUS WITH POSITIVE HIGH RISK HPV CERVICAL: ICD-10-CM

## 2019-10-23 DIAGNOSIS — R87.810 ASCUS WITH POSITIVE HIGH RISK HPV CERVICAL: Primary | ICD-10-CM

## 2019-10-23 DIAGNOSIS — R87.610 ASCUS WITH POSITIVE HIGH RISK HPV CERVICAL: ICD-10-CM

## 2019-10-23 DIAGNOSIS — R87.610 ASCUS WITH POSITIVE HIGH RISK HPV CERVICAL: Primary | ICD-10-CM

## 2019-10-23 PROCEDURE — 87624 HPV HI-RISK TYP POOLED RSLT: CPT

## 2019-10-23 PROCEDURE — 88175 CYTOPATH C/V AUTO FLUID REDO: CPT

## 2019-10-23 PROCEDURE — 99213 OFFICE O/P EST LOW 20 MIN: CPT | Performed by: NURSE PRACTITIONER

## 2019-10-23 PROCEDURE — 99213 OFFICE O/P EST LOW 20 MIN: CPT

## 2019-10-25 LAB
HPV SAMPLE: NORMAL
HPV, GENOTYPE 16: NOT DETECTED
HPV, GENOTYPE 18: NOT DETECTED
HPV, HIGH RISK OTHER: NOT DETECTED
HPV, INTERPRETATION: NORMAL
SPECIMEN DESCRIPTION: NORMAL

## 2019-10-28 LAB — CYTOLOGY REPORT: NORMAL

## 2020-05-21 ENCOUNTER — TELEPHONE (OUTPATIENT)
Dept: PHARMACY | Age: 50
End: 2020-05-21

## 2020-05-21 NOTE — TELEPHONE ENCOUNTER
Spoke with patient for COVID 19 screening secondary to upcoming appointment tomorrow . At this time patient denies symptoms, recent travel and exposure. Patient will report to [facility] for INR check at scheduled time. Patient educated to call physician or go to ER with respiratory symptoms or fever and to not present to appointment if symptomatic. Will coordinate for next INR check accordingly.

## 2020-05-22 ENCOUNTER — OFFICE VISIT (OUTPATIENT)
Dept: OBGYN CLINIC | Age: 50
End: 2020-05-22
Payer: COMMERCIAL

## 2020-05-22 ENCOUNTER — HOSPITAL ENCOUNTER (OUTPATIENT)
Dept: PHARMACY | Age: 50
Setting detail: THERAPIES SERIES
Discharge: HOME | End: 2020-05-22
Payer: COMMERCIAL

## 2020-05-22 ENCOUNTER — HOSPITAL ENCOUNTER (OUTPATIENT)
Age: 50
Setting detail: SPECIMEN
Discharge: HOME | End: 2020-05-22
Payer: COMMERCIAL

## 2020-05-22 VITALS
DIASTOLIC BLOOD PRESSURE: 84 MMHG | SYSTOLIC BLOOD PRESSURE: 128 MMHG | WEIGHT: 226 LBS | BODY MASS INDEX: 42.67 KG/M2 | HEIGHT: 61 IN | TEMPERATURE: 97.4 F

## 2020-05-22 LAB
CREAT SERPL-MCNC: 0.94 MG/DL (ref 0.5–0.9)
GFR AFRICAN AMERICAN: >60 ML/MIN
GFR NON-AFRICAN AMERICAN: >60 ML/MIN
GFR SERPL CREATININE-BSD FRML MDRD: ABNORMAL ML/MIN/{1.73_M2}
GFR SERPL CREATININE-BSD FRML MDRD: ABNORMAL ML/MIN/{1.73_M2}
HCT VFR BLD CALC: 44.5 % (ref 36–46)
HEMOGLOBIN: 14.8 G/DL (ref 12–16)

## 2020-05-22 PROCEDURE — 88175 CYTOPATH C/V AUTO FLUID REDO: CPT

## 2020-05-22 PROCEDURE — 85018 HEMOGLOBIN: CPT

## 2020-05-22 PROCEDURE — 85014 HEMATOCRIT: CPT

## 2020-05-22 PROCEDURE — 87624 HPV HI-RISK TYP POOLED RSLT: CPT

## 2020-05-22 PROCEDURE — 36415 COLL VENOUS BLD VENIPUNCTURE: CPT

## 2020-05-22 PROCEDURE — 82565 ASSAY OF CREATININE: CPT

## 2020-05-22 PROCEDURE — 99396 PREV VISIT EST AGE 40-64: CPT | Performed by: NURSE PRACTITIONER

## 2020-05-22 PROCEDURE — 99211 OFF/OP EST MAY X REQ PHY/QHP: CPT

## 2020-05-22 ASSESSMENT — PATIENT HEALTH QUESTIONNAIRE - PHQ9
1. LITTLE INTEREST OR PLEASURE IN DOING THINGS: 0
SUM OF ALL RESPONSES TO PHQ QUESTIONS 1-9: 0
SUM OF ALL RESPONSES TO PHQ9 QUESTIONS 1 & 2: 0
2. FEELING DOWN, DEPRESSED OR HOPELESS: 0
SUM OF ALL RESPONSES TO PHQ QUESTIONS 1-9: 0

## 2020-05-22 NOTE — PROGRESS NOTES
fatigue  Head and Eyes: No vision, Headache, Dizziness or trauma in last 12 months  ENT ROS: No hearing, Tinnitis, sinus or taste problems  Hematological and Lymphatic ROS:No Lymphoma, Von Willebrand's, Hemophillia or Bleeding History. + HX DVT in 2013, antithrombin 3 deficiency. Psych ROS: No Depression, Homicidal thoughts,suicidal thoughts, or anxiety  Breast ROS: No prior breast abnormalities or lumps  Respiratory ROS: No SOB, Pneumoniae,Cough, or Pulmonary Embolism History  Cardiovascular ROS: No Chest Pain with Exertion, Palpitations, Syncope, Edema, Arrhythmia  Gastrointestinal ROS: No Indigestion, Heartburn, Nausea, vomiting, Diarrhea, Constipation,or Bowel Changes; No Bloody Stools or melena  Genito-Urinary ROS: No Dysuria, Hematuria or Nocturia. No Urinary Incontinence or Vaginal Discharge  Musculoskeletal ROS: No Arthralgia, Arthritis,Gout,Osteoporosis or Rheumatism  Neurological ROS: No CVA, Migraines, Epilepsy, Seizure Hx, or Limb Weakness  Dermatological ROS: No Rash, Itching, Hives, Mole Changes or Cancer                                                                                                                                                                                                                                  PHYSICAL Exam:     Constitutional:  Vitals:    05/22/20 0940   BP: 128/84   Site: Right Upper Arm   Position: Sitting   Cuff Size: Medium Adult   Temp: 97.4 °F (36.3 °C)   Weight: 226 lb (102.5 kg)   Height: 5' 1\" (1.549 m)         General Appearance: This  is a well Developed, well Nourished, well groomed female. Her BMI was reviewed. Nutritional decision making was discussed. Skin:  There was a Normal Inspection of the skin without rashes or lesions. There were no rashes. (Papular, Maculopapular, Hives, Pustular, Macular)     There were no lesions (Ulcers, Erythema, Abn.  Appearing Nevi)            Lymphatic:  No Lymph Nodes were Palpable in the neck , axilla or

## 2020-06-01 LAB — CYTOLOGY REPORT: NORMAL

## 2020-06-01 NOTE — PROGRESS NOTES
Procedure(s):  LAPAROSCOPIC BILATERAL SALPINGO OOPHORECTOMY. general    <BSHSIANPOST>    INITIAL Post-op Vital signs:   Vitals Value Taken Time   BP 91/61 6/1/2020 12:30 PM   Temp 36.7 °C (98 °F) 6/1/2020 12:20 PM   Pulse 75 6/1/2020 12:48 PM   Resp 16 6/1/2020 12:48 PM   SpO2 97 % 6/1/2020 12:48 PM   Vitals shown include unvalidated device data. 10/5/2019     Order Specific Question:   Collection Type     Answer: Thin Prep     Order Specific Question:   Prior Abnormal Pap Test     Answer:   No     Order Specific Question:   Screening or Diagnostic     Answer:   Diagnostic     Order Specific Question:   HPV Requested? Answer:   Yes     Order Specific Question:   High Risk Patient     Answer:   N/A       Patient was seen with total face to face time of 15 minutes. More than 50% of this visit was counseling and education regarding The primary encounter diagnosis was Special screening examination for human papillomavirus (HPV). A diagnosis of ASCUS with positive high risk HPV cervical was also pertinent to this visit. and Follow-up   as well as  counseling on preventative health maintenance follow-up.

## 2021-04-06 ENCOUNTER — IMMUNIZATION (OUTPATIENT)
Dept: PRIMARY CARE CLINIC | Age: 51
End: 2021-04-06
Payer: COMMERCIAL

## 2021-04-06 PROCEDURE — 91300 COVID-19, PFIZER VACCINE 30MCG/0.3ML DOSE: CPT | Performed by: INTERNAL MEDICINE

## 2021-04-06 PROCEDURE — 0001A COVID-19, PFIZER VACCINE 30MCG/0.3ML DOSE: CPT | Performed by: INTERNAL MEDICINE

## 2021-04-27 ENCOUNTER — IMMUNIZATION (OUTPATIENT)
Dept: PRIMARY CARE CLINIC | Age: 51
End: 2021-04-27
Payer: COMMERCIAL

## 2021-04-27 PROCEDURE — 0002A COVID-19, PFIZER VACCINE 30MCG/0.3ML DOSE: CPT | Performed by: INTERNAL MEDICINE

## 2021-04-27 PROCEDURE — 91300 COVID-19, PFIZER VACCINE 30MCG/0.3ML DOSE: CPT | Performed by: INTERNAL MEDICINE

## 2021-05-28 ENCOUNTER — OFFICE VISIT (OUTPATIENT)
Dept: OBGYN CLINIC | Age: 51
End: 2021-05-28
Payer: COMMERCIAL

## 2021-05-28 ENCOUNTER — HOSPITAL ENCOUNTER (OUTPATIENT)
Age: 51
Setting detail: SPECIMEN
Discharge: HOME | End: 2021-05-28
Payer: COMMERCIAL

## 2021-05-28 VITALS
WEIGHT: 209 LBS | HEART RATE: 97 BPM | HEIGHT: 62 IN | DIASTOLIC BLOOD PRESSURE: 64 MMHG | SYSTOLIC BLOOD PRESSURE: 112 MMHG | BODY MASS INDEX: 38.46 KG/M2

## 2021-05-28 DIAGNOSIS — Z11.51 SCREENING FOR HUMAN PAPILLOMAVIRUS (HPV): ICD-10-CM

## 2021-05-28 DIAGNOSIS — Z01.419 WELL FEMALE EXAM WITH ROUTINE GYNECOLOGICAL EXAM: ICD-10-CM

## 2021-05-28 DIAGNOSIS — Z12.31 ENCOUNTER FOR SCREENING MAMMOGRAM FOR MALIGNANT NEOPLASM OF BREAST: ICD-10-CM

## 2021-05-28 DIAGNOSIS — Z12.11 SCREENING FOR COLON CANCER: ICD-10-CM

## 2021-05-28 DIAGNOSIS — Z01.419 WELL FEMALE EXAM WITH ROUTINE GYNECOLOGICAL EXAM: Primary | ICD-10-CM

## 2021-05-28 PROCEDURE — 87624 HPV HI-RISK TYP POOLED RSLT: CPT

## 2021-05-28 PROCEDURE — 99396 PREV VISIT EST AGE 40-64: CPT | Performed by: NURSE PRACTITIONER

## 2021-05-28 PROCEDURE — G0145 SCR C/V CYTO,THINLAYER,RESCR: HCPCS

## 2021-05-28 ASSESSMENT — PATIENT HEALTH QUESTIONNAIRE - PHQ9: SUM OF ALL RESPONSES TO PHQ9 QUESTIONS 1 & 2: 0

## 2021-05-28 NOTE — PROGRESS NOTES
History and Physical  [address], [address] [address], [place] [address]. [phone]   Fax [phone]  Molly Mares  2021              53 y.o. Chief Complaint   Patient presents with    Annual Exam       No LMP recorded. Patient has had an ablation. Primary Care Physician: YAIMA Yuen - CNP    The patient was seen and examined. She has no chief complaint today and is here for her annual exam.  Her bowels are regular. There are no voiding complaints. She denies any bloating. She denies vaginal discharge and was counseled on STD's and the need for barrier contraception. HPI : Molly Mares is a 53 y.o. female [id number]    Annual exam  Hx of endometrial ablation - has occasional light spotting for a day or two.   No chief complaint  ________________________________________________________________________  OB History    Para Term  AB Living   4 4 4 0 0 4   SAB TAB Ectopic Molar Multiple Live Births   0 0 0 0 0 4      # Outcome Date GA Lbr Kush/2nd Weight Sex Delivery Anes PTL Lv   4 Term     F    KUNAL   3 Term     F    KUNAL   2 Term     M CS-LTranv   KUNAL   1 Term     F CS-LTranv   KUNAL     Past Medical History:   Diagnosis Date    Atypical squamous cells of undetermined significance on cytologic smear of cervix (ASC-US) +HPV (other) 2018 8/3/2018    DVT (deep venous thrombosis) (HCC)     left leg    Hx: UTI (urinary tract infection)     Phobia-Closed Spaces 2013                                                                   Past Surgical History:   Procedure Laterality Date     SECTION      x2    CHOLECYSTECTOMY      ENDOMETRIAL ABLATION  13    [facility]    TUBAL LIGATION      VENA CAVA FILTER PLACEMENT  2013    WISDOM TOOTH EXTRACTION       Family History   Problem Relation Age of Onset    Diabetes Mother     Diabetes Father     Breast Cancer Maternal Aunt Fear of Current or Ex-Partner:     Emotionally Abused:     Physically Abused:     Sexually Abused:        MEDICATIONS:  Current Outpatient Medications   Medication Sig Dispense Refill    apixaban (ELIQUIS) 2.5 MG TABS tablet Take 1 tablet by mouth 2 times daily 60 tablet 5     No current facility-administered medications for this visit. ALLERGIES:  Allergies as of 05/28/2021    (No Known Allergies)       Symptoms of decreased mood absent  Symptoms of anhedonia absent    **If either question is answered in a  positive fashion then complete the PHQ9 Scoring Evaluation and make the appropriate referral**      Immunization status: stated as current, but no records available. Gynecologic History:  Menarche: 16 yo  Menopause at [age] yo     No LMP recorded. Patient has had an ablation. Sexually Active: No    STD History: No     Permanent Sterilization: Yes TL   Reversible Birth Control: No        Hormone Replacement Exposure: No      Genetic Qualified Family History of Breast, Ovarian , Colon or Uterine Cancer: Yes (maternal aunt with breast cancer- unsure of age, 2 maternal cousins- age 45s, 35s) patient declines referral to genetic counselor. If YES see scanned worksheet. Preventative Health Testing:    Health Maintenance:  Health Maintenance Due   Topic Date Due    Hepatitis C screen  Never done    Pneumococcal 0-64 years Vaccine (1 of 2 - PPSV23) Never done    HIV screen  Never done    A1C test (Diabetic or Prediabetic)  03/16/2016    Lipid screen  06/22/2018    DTaP/Tdap/Td vaccine (2 - Td) 07/01/2018       Date of Last Pap Smear: 5/22/2020 neg/neg  Abnormal Pap Smear History: yes   Colposcopy History: 12/2018  Date of Last Mammogram: 8/15/2019 neg  Date of Last Colonoscopy:   Date of Last Bone Density:      ________________________________________________________________________        REVIEW OF SYSTEMS:    yes   A minimum of an eleven point review of systems was completed.     Review Of Systems (11 point):  Constitutional: No fever, chills or malaise; No weight change or fatigue  Head and Eyes: No vision, Headache, Dizziness or trauma in last 12 months  ENT ROS: No hearing, Tinnitis, sinus or taste problems  Hematological and Lymphatic ROS:No Lymphoma, Von Willebrand's, Hemophillia or Bleeding History  Psych ROS: No Depression, Homicidal thoughts,suicidal thoughts, or anxiety  Breast ROS: No prior breast abnormalities or lumps  Respiratory ROS: No SOB, Pneumoniae,Cough, or Pulmonary Embolism History  Cardiovascular ROS: No Chest Pain with Exertion, Palpitations, Syncope, Edema, Arrhythmia  Gastrointestinal ROS: No Indigestion, Heartburn, Nausea, vomiting, Diarrhea, Constipation,or Bowel Changes; No Bloody Stools or melena  Genito-Urinary ROS: No Dysuria, Hematuria or Nocturia. No Urinary Incontinence or Vaginal Discharge  Musculoskeletal ROS: No Arthralgia, Arthritis,Gout,Osteoporosis or Rheumatism  Neurological ROS: No CVA, Migraines, Epilepsy, Seizure Hx, or Limb Weakness  Dermatological ROS: No Rash, Itching, Hives, Mole Changes or Cancer                                                                                                                                                                                                                                  PHYSICAL Exam:     Constitutional:  Vitals:    05/28/21 0859   BP: 112/64   Site: Right Upper Arm   Position: Sitting   Cuff Size: Large Adult   Pulse: 97   Weight: 209 lb (94.8 kg)   Height: 5' 2\" (1.575 m)       Chaperone for Intimate Exam   Chaperone was offered and accepted as part of the rooming process.  Chaperone: Tasha Shearer Appearance: This  is a well Developed, well Nourished, well groomed female. Her BMI was reviewed. Nutritional decision making was discussed. Skin:  There was a Normal Inspection of the skin without rashes or lesions. There were no rashes.   (Papular, Maculopapular, Hives, Pustular, Macular)     There were no lesions (Ulcers, Erythema, Abn. Appearing Nevi)            Lymphatic:  No Lymph Nodes were Palpable in the neck , axilla or groin.  0 # Of Lymph Nodes; Location ; Character [Normal]  [Shotty] [Tender] [Enlarged]     Neck and EENT:  The neck was supple. There were no masses   The thyroid was not enlarged and had no masses. Perrla, EOMI B/L, TMI B/L No Abnormalities. Throat inspected-No exudates or Masses, Nares Patent No Masses        Respiratory: The lungs were auscultated and found to be clear. There were no rales, rhonchi or wheezes. There was a good respiratory effort. Cardiovascular: The heart was in a regular rate and rhythm. . No S3 or S4. There was no murmur appreciated. Location, grade, and radiation are not applicable. Extremities: The patients extremities were without calf tenderness, edema, or varicosities. There was full range of motion in all four extremities. Pulses in all four extremities were appreciated and are 2/4. Abdomen: The abdomen was soft and non-tender. There were good bowel sounds in all quadrants and there was no guarding, rebound or rigidity. On evaluation there was no evidence of hepatosplenomegaly and there was no costal vertebral alison tenderness bilaterally. No hernias were appreciated. Abdominal Scars: C/S scar    Psych: The patient had a normal Orientation to: Time, Place, Person, and Situation  There is no Mood / Affect changes    Breast:  (Chest)  normal appearance, no masses or tenderness  Self breast exams were reviewed in detail. Literature was given. Pelvic Exam:  Vulva and vagina appear normal. Bimanual exam reveals normal uterus and adnexa. Rectal Exam:  exam declined by patient          Musculosk:  Normal Gait and station was noted. Digits were evaluated without abnormal findings. Range of motion, stability and strength were evaluated and found to be appropriate for the patients age.               ASSESSMENT: 53 y.o. Annual   Diagnosis Orders   1. Well female exam with routine gynecological exam  PAP SMEAR   2. Screening for human papillomavirus (HPV)  PAP SMEAR   3. Encounter for screening mammogram for malignant neoplasm of breast  GUMARO DIGITAL SCREEN W OR WO CAD BILATERAL   4. Screening for colon cancer  Cologuard (For External Results Only)          Chief Complaint   Patient presents with    Annual Exam          Past Medical History:   Diagnosis Date    Atypical squamous cells of undetermined significance on cytologic smear of cervix (ASC-US) +HPV (other) 4/2018 8/3/2018    DVT (deep venous thrombosis) (HCC)     left leg    Hx: UTI (urinary tract infection)     Phobia-Closed Spaces 5/28/2013         Patient Active Problem List    Diagnosis Date Noted    ASCUS with positive high risk HPV cervical 08/03/2018     Priority: High     8/2018 Colposcopy completed with ECC      Hx of long term use of blood thinners (Eloquis) 08/03/2018     Priority: High    Enlarged uterus 06/24/2013     Priority: High    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 Tobacco & Secondary smoke risks reviewed; instructed on cessation and avoidance      Counseling Completed:  Preventative Health Recommendations and Follow up. The patient was informed of the recommended preventative health recommendations.     1. Annuals every year; Cytology collections per prevailing guidelines. 2. Mammograms begin every year at 35 yo if no abnormalities are found and no family history. 3. Bone density studies every 2-3 years. Begin at 71 yo. If no fracture history or osteoporosis family history. (significant). 4. Colonoscopy begin at 38 yo. Repeat every ten years if negative and no family history. 5. Calcium of 8526-0390 mg/day in split dosing  6. Vitamin D 400-800 IU/day  7. All other preventative health recommendations will be managed by the patients Primary care physician. PLAN:  Return in about 1 year (around 5/28/2022) for annual.   Pap smear obtained. Declines colonoscopy. Prefers Cologuard. cologuard ordered. Mammogram ordered. Repeat Annual every 1 year  Cervical Cytology Evaluation begins at 24years old. If Negative Cytology, Follow-up screening per current guidelines. Mammograms every 1 year. If 35 yo and last mammogram was negative. Calcium and Vitamin D dosing reviewed. Colonoscopy screening reviewed as well as onset for bone density testing. Birth control and barrier recommendations discussed. STD counseling and prevention reviewed. Gardisil counseling completed for all patients 10-35 yo. Routine health maintenance per patients PCP. Orders Placed This Encounter   Procedures    Cologuard (For External Results Only)     This test is performed by an external laboratory and is used for result attachment only. It is required that this order requisition be faxed to: [facility] @ [phone]. See [email or web address] for further information. Standing Status:   Future     Standing Expiration Date:   5/28/2022    GUMARO DIGITAL SCREEN W OR WO CAD BILATERAL     Standing Status:   Future     Standing Expiration Date:   5/28/2022     Order Specific Question:   Reason for exam:     Answer:   screening for malignant neoplasm of breast    PAP SMEAR     Patient History:    No LMP recorded. Patient has had an ablation.   OBGYN Status: Ablation Past Surgical History:  No date:  SECTION      Comment:  x2  2008: CHOLECYSTECTOMY  13: ENDOMETRIAL ABLATION      Comment:  [employer] [place]  No date: TUBAL LIGATION  2013: VENA CAVA FILTER PLACEMENT  No date: WISDOM TOOTH EXTRACTION      Social History    Tobacco Use      Smoking status: Current Every Day Smoker        Packs/day: 0.50        Years: 20.00        Pack years: 10        Types: Cigarettes      Smokeless tobacco: Never Used      Tobacco comment: trying to quit       Standing Status:   Future     Standing Expiration Date:   2021     Order Specific Question:   Collection Type     Answer: Thin Prep     Order Specific Question:   Prior Abnormal Pap Test     Answer:   Yes     Comments:   +HPV     Order Specific Question:   If Prior Abnormal, Give Date     Answer:   10/23/19     Order Specific Question:   Prior Treatment     Answer: Other     Comments:   colposcopy      Order Specific Question:   Screening or Diagnostic     Answer:   Screening     Order Specific Question:   HPV Requested? Answer:   Yes     Order Specific Question:   High Risk Patient     Answer:   N/A           The patient, Sandra Haq is a 53 y.o. female, was seen with a total time spent of 20 minutes for the visit on this date of service by the E/M provider. The time component had both face to face and non face to face time spent in determining the total time component. Counseling and education regarding her diagnosis listed below and her options regarding those diagnoses were also included in determining her time component. Diagnosis Orders   1. Well female exam with routine gynecological exam  PAP SMEAR   2. Screening for human papillomavirus (HPV)  PAP SMEAR   3.  Encounter for screening mammogram for malignant neoplasm of breast  GUMARO DIGITAL SCREEN W OR WO CAD BILATERAL   4. Screening for colon cancer  Cologuard (For External Results Only)        The patient had her preventative health maintenance recommendations and follow-up reviewed with her at the completion of her visit.

## 2021-06-02 LAB
CYTOLOGY REPORT: NORMAL
HPV SAMPLE: NORMAL
HPV, GENOTYPE 16: NOT DETECTED
HPV, GENOTYPE 18: NOT DETECTED
HPV, HIGH RISK OTHER: NOT DETECTED
HPV, INTERPRETATION: NORMAL
SPECIMEN DESCRIPTION: NORMAL

## 2021-06-25 ENCOUNTER — HOSPITAL ENCOUNTER (OUTPATIENT)
Dept: WOMENS IMAGING | Age: 51
Discharge: HOME | End: 2021-06-27
Payer: COMMERCIAL

## 2021-06-25 DIAGNOSIS — Z12.31 ENCOUNTER FOR SCREENING MAMMOGRAM FOR MALIGNANT NEOPLASM OF BREAST: ICD-10-CM

## 2021-06-25 PROCEDURE — 77063 BREAST TOMOSYNTHESIS BI: CPT

## 2022-06-17 ENCOUNTER — OFFICE VISIT (OUTPATIENT)
Dept: OBGYN CLINIC | Age: 52
End: 2022-06-17
Payer: COMMERCIAL

## 2022-06-17 VITALS
BODY MASS INDEX: 39.01 KG/M2 | SYSTOLIC BLOOD PRESSURE: 112 MMHG | DIASTOLIC BLOOD PRESSURE: 64 MMHG | HEIGHT: 62 IN | WEIGHT: 212 LBS

## 2022-06-17 DIAGNOSIS — Z11.51 SPECIAL SCREENING EXAMINATION FOR HUMAN PAPILLOMAVIRUS (HPV): ICD-10-CM

## 2022-06-17 DIAGNOSIS — Z12.31 ENCOUNTER FOR SCREENING MAMMOGRAM FOR MALIGNANT NEOPLASM OF BREAST: ICD-10-CM

## 2022-06-17 DIAGNOSIS — Z01.419 WELL WOMAN EXAM WITH ROUTINE GYNECOLOGICAL EXAM: Primary | ICD-10-CM

## 2022-06-17 DIAGNOSIS — Z12.12 ENCOUNTER FOR COLORECTAL CANCER SCREENING: ICD-10-CM

## 2022-06-17 DIAGNOSIS — Z12.11 ENCOUNTER FOR COLORECTAL CANCER SCREENING: ICD-10-CM

## 2022-06-17 PROBLEM — D47.3 ESSENTIAL THROMBOCYTOSIS (HCC): Status: ACTIVE | Noted: 2020-09-21

## 2022-06-17 PROCEDURE — 99396 PREV VISIT EST AGE 40-64: CPT | Performed by: NURSE PRACTITIONER

## 2022-06-17 RX ORDER — CHOLECALCIFEROL (VITAMIN D3) 1250 MCG
CAPSULE ORAL
COMMUNITY
Start: 2022-04-12

## 2022-06-17 RX ORDER — LORATADINE, PSEUDOEPHEDRINE SULFATE 5; 120 MG/1; MG/1
TABLET, FILM COATED, EXTENDED RELEASE ORAL
COMMUNITY
Start: 2022-06-09

## 2022-06-17 RX ORDER — LISINOPRIL 20 MG/1
TABLET ORAL
COMMUNITY

## 2022-06-17 RX ORDER — PREDNISONE 20 MG/1
TABLET ORAL
COMMUNITY
Start: 2022-05-31

## 2022-06-17 RX ORDER — FLUTICASONE PROPIONATE 50 MCG
SPRAY, SUSPENSION (ML) NASAL
COMMUNITY
Start: 2022-05-31

## 2022-06-17 RX ORDER — LISINOPRIL 20 MG/1
TABLET ORAL
COMMUNITY
Start: 2022-05-13 | End: 2022-06-17 | Stop reason: SDUPTHER

## 2022-06-17 ASSESSMENT — PATIENT HEALTH QUESTIONNAIRE - PHQ9
SUM OF ALL RESPONSES TO PHQ QUESTIONS 1-9: 0
1. LITTLE INTEREST OR PLEASURE IN DOING THINGS: 0
SUM OF ALL RESPONSES TO PHQ QUESTIONS 1-9: 0
2. FEELING DOWN, DEPRESSED OR HOPELESS: 0
SUM OF ALL RESPONSES TO PHQ9 QUESTIONS 1 & 2: 0
SUM OF ALL RESPONSES TO PHQ QUESTIONS 1-9: 0
SUM OF ALL RESPONSES TO PHQ QUESTIONS 1-9: 0

## 2022-06-17 NOTE — PROGRESS NOTES
History and Physical  [address], [address] [address], [address] [facility] [address] [phone]   Fax [phone]  Matt Ortega  2022              49 y.o. Chief Complaint   Patient presents with    Annual Exam       No LMP recorded. Patient has had an ablation. Primary Care Physician: YAIMA Rsos - CNP    The patient was seen and examined. She has no chief complaint today and is here for her annual exam.  Her bowels are regular. There are no voiding complaints. She denies any bloating. She denies vaginal discharge and was counseled on STD's and the need for barrier contraception. HPI : Matt Ortega is a 49 y.o. female [id number]    Annual exam  No chief complaint  No longer having periods after having ablation in . Denies menopausal symptoms.   ________________________________________________________________________  [place] History    Para Term  AB Living   4 4 4 0 0 4   SAB IAB Ectopic Molar Multiple Live Births   0 0 0 0 0 4      # Outcome Date GA Lbr Kush/2nd Weight Sex Delivery Anes PTL Lv   4 Term     F    KUNAL   3 Term     F    KUNAL   2 Term     M CS-LTranv   KUNAL   1 Term     F CS-LTranv   KUNAL     Past Medical History:   Diagnosis Date    Atypical squamous cells of undetermined significance on cytologic smear of cervix (ASC-US) +HPV (other) 2018 8/3/2018    DVT (deep venous thrombosis) (HCC)     left leg    Essential thrombocytosis ([address] 2020    Hx: UTI (urinary tract infection)     Phobia-Closed Spaces 2013                                                                   Past Surgical History:   Procedure Laterality Date     SECTION      x2    CHOLECYSTECTOMY  2008    ENDOMETRIAL ABLATION  13    [facility]    TUBAL LIGATION      VENA CAVA FILTER PLACEMENT  2013    WISDOM TOOTH EXTRACTION       Family History   Problem Relation Age of Onset    Diabetes Mother     Diabetes Father     Breast Cancer Maternal Aunt         dx after age 48    Diabetes Maternal Uncle     Diabetes Maternal Grandmother     Diabetes Maternal Uncle     Heart Attack Paternal Grandmother     Heart Attack Paternal Grandfather     Cancer Neg Hx     Colon Cancer Neg Hx     Eclampsia Neg Hx     Hypertension Neg Hx     Ovarian Cancer Neg Hx      Labor Neg Hx     Spont Abortions Neg Hx     Stroke Neg Hx      Social History     Socioeconomic History    Marital status: Single     Spouse name: Not on file    Number of children: Not on file    Years of education: Not on file    Highest education level: Not on file   Occupational History    Not on file   Tobacco Use    Smoking status: Light Tobacco Smoker     Packs/day: 0.50     Years: 20.00     Pack years: 10.00     Types: Cigarettes    Smokeless tobacco: Never Used    Tobacco comment: trying to quit   Substance and Sexual Activity    Alcohol use: Not Currently     Alcohol/week: 0.0 standard drinks     Comment: Occassional    Drug use: No    Sexual activity: Not Currently     Partners: Male     Birth control/protection: Surgical     Comment: tubal   Other Topics Concern    Not on file   Social History Narrative    Not on file     Social Determinants of Health     Financial Resource Strain:     Difficulty of Paying Living Expenses: Not on file   Food Insecurity:     Worried About Running Out of Food in the Last Year: Not on file    [place] of Food in the Last Year: Not on file   Transportation Needs:     Lack of Transportation (Medical): Not on file    Lack of Transportation (Non-Medical):  Not on file   Physical Activity:     Days of Exercise per Week: Not on file    Minutes of Exercise per Session: Not on file   Stress:     Feeling of Stress : Not on file   Social Connections:     Frequency of Communication with Friends and Family: Not on file    Frequency of Social Gatherings with Friends and Family: Not on file    Attends Zoroastrianism Services: Not on file    Active Member of Clubs or Organizations: Not on file    Attends Club or Organization Meetings: Not on file    Marital Status: Not on file   Intimate Partner Violence:     Fear of Current or Ex-Partner: Not on file    Emotionally Abused: Not on file    Physically Abused: Not on file    Sexually Abused: Not on file   Housing Stability:     Unable to Pay for Housing in the Last Year: Not on file    Number of [place] in the Last Year: Not on file    Unstable Housing in the Last Year: Not on file       MEDICATIONS:  Current Outpatient Medications   Medication Sig Dispense Refill    Cholecalciferol (VITAMIN D3) 1.25 MG (54493 UT) CAPS TAKE 1 CAPSULE BY MOUTH ONCE A WEEK      lisinopril (PRINIVIL;ZESTRIL) 20 MG tablet       fluticasone (FLONASE) 50 MCG/ACT nasal spray USE 1 SPRAY(S) IN EACH NOSTRIL IN THE MORNING      LORATADINE-D 12HR 5-120 MG per extended release tablet       predniSONE (DELTASONE) 20 MG tablet TAKE 1 TABLET BY MOUTH IN THE MORNING      apixaban (ELIQUIS) 2.5 MG TABS tablet Take 1 tablet by mouth 2 times daily 60 tablet 5     No current facility-administered medications for this visit. ALLERGIES:  Allergies as of 06/17/2022    (No Known Allergies)       Symptoms of decreased mood absent  Symptoms of anhedonia absent    **If either question is answered in a  positive fashion then complete the PHQ9 Scoring Evaluation and make the appropriate referral**      Immunization status: stated as current, but no records available. Gynecologic History:  Menarche: 8 yo  Menopause at after ablation - stopped menses yo     No LMP recorded. Patient has had an ablation.     Sexually Active: no- been awhile    STD History: No     Permanent Sterilization: Yes TL   Reversible Birth Control: No        Hormone Replacement Exposure: No      Genetic Qualified Family History of Breast, Ovarian , Colon or Uterine Cancer: Yes (patient's maternal aunt with breast cancer, maternal cousin with breast cancer)declines counseling/ testing     If YES see scanned worksheet. Preventative Health Testing:    Health Maintenance:  Health Maintenance Due   Topic Date Due    Pneumococcal 0-64 years Vaccine (1 - PCV) Never done    HIV screen  Never done    Hepatitis C screen  Never done    Colorectal Cancer Screen  Never done    DTaP/Tdap/Td vaccine (2 - Td or Tdap) 07/01/2018    Shingles vaccine (1 of 2) Never done    COVID-19 Vaccine (3 - Booster for Pfizer series) 09/27/2021    Depression Screen  05/28/2022       Date of Last Pap Smear: 5/28/2021 neg/neg  Abnormal Pap Smear History: yes  Colposcopy History: 2018  Date of Last Mammogram: 6/25/20121 neg  Date of Last Colonoscopy:   Date of Last Bone Density:      ________________________________________________________________________        REVIEW OF SYSTEMS:    yes   A minimum of an eleven point review of systems was completed. Review Of Systems (11 point):  Constitutional: No fever, chills or malaise; No weight change or fatigue  Head and Eyes: No vision changes, Headache, Dizziness or trauma in last 12 months  ENT ROS: No hearing, Tinnitis, sinus or taste problems  Hematological and Lymphatic ROS:No Lymphoma, Von Willebrand's, Hemophillia or Bleeding History. DVT  Psych ROS: No Depression, Homicidal thoughts,suicidal thoughts, or anxiety  Breast ROS: No breast abnormalities or lumps  Respiratory ROS: No SOB, Pneumoniae,Cough, or Pulmonary Embolism   Cardiovascular ROS: No Chest Pain with Exertion, Palpitations, Syncope, Edema, Arrhythmia. + hypertension  Gastrointestinal ROS: No Indigestion, Heartburn, Nausea, vomiting, Diarrhea, Constipation,or Bowel Changes; No Bloody Stools or melena  Genito-Urinary ROS: No Dysuria, Hematuria or Nocturia.  No Urinary Incontinence or Vaginal Discharge  Musculoskeletal ROS: No Arthralgia, Arthritis,Gout,Osteoporosis or Rheumatism  Neurological ROS: No CVA, Migraines, Epilepsy, Seizure Hx, or Limb Weakness  Dermatological ROS: No Rash, Itching, Hives, Mole Changes or Cancer                                                                                                                                                                                                                                  PHYSICAL Exam:     Constitutional:  Vitals:    06/17/22 0933   BP: 112/64   Site: Right Upper Arm   Position: Sitting   Cuff Size: Large Adult   Weight: 212 lb (96.2 kg)   Height: 5' 2\" (1.575 m)       Chaperone for Intimate Exam   Chaperone was offered and accepted as part of the rooming process.  Chaperone: Vargas Lozano Appearance: This  is a well Developed, well Nourished, well groomed female. Her BMI was reviewed. Nutritional decision making was discussed. Skin:  There was a Normal Inspection of the skin without rashes or lesions. There were no rashes. (Papular, Maculopapular, Hives, Pustular, Macular)     There were no lesions (Ulcers, Erythema, Abn. Appearing Nevi)            Lymphatic:  No Lymph Nodes were Palpable in the neck , axilla or groin.  0 # Of Lymph Nodes; Location ; Character [Normal]  [Shotty] [Tender] [Enlarged]     Neck and EENT:  The neck was supple. There were no masses   The thyroid was not enlarged and had no masses. Perrla, EOMI B/L, TMI B/L No Abnormalities. Throat inspected-No exudates or Masses, Nares Patent No Masses        Respiratory: The lungs were auscultated and found to be clear. There were no rales, rhonchi or wheezes. There was a good respiratory effort. Cardiovascular: The heart was in a regular rate and rhythm. . No S3 or S4. There was no murmur appreciated. Location, grade, and radiation are not applicable. Extremities: The patients extremities were without calf tenderness, edema, or varicosities. There was full range of motion in all four extremities.  Pulses in all four extremities were appreciated and are 2/4.    Abdomen: The abdomen was soft and non-tender. There were good bowel sounds in all quadrants and there was no guarding, rebound or rigidity. On evaluation there was no evidence of hepatosplenomegaly and there was no costal vertebral alison tenderness bilaterally. No hernias were appreciated. Abdominal Scars: C/W previous surgeries    Psych: The patient had a normal Orientation to: Time, Place, Person, and Situation  There is no Mood / Affect changes    Breast:  (Chest)  normal appearance, no masses or tenderness, No nipple retraction or dimpling, No nipple discharge or bleeding, No axillary or supraclavicular adenopathy, Normal to palpation without dominant masses  Self breast exams were reviewed in detail. Literature was given. Pelvic Exam:  External genitalia: normal general appearance  Urinary system: urethral meatus normal  Vaginal: normal mucosa without prolapse or lesions  Cervix: normal appearance  Adnexa: normal bimanual exam  Uterus: normal single, nontender    Rectal Exam:  exam declined by patient          Musculosk:  Normal Gait and station was noted. Digits were evaluated without abnormal findings. Range of motion, stability and strength were evaluated and found to be appropriate for the patients age. ASSESSMENT:      49 y.o. Annual   Diagnosis Orders   1. Well woman exam with routine gynecological exam  PAP SMEAR   2. Encounter for screening mammogram for malignant neoplasm of breast  GUMARO DIGITAL SCREEN W OR WO CAD BILATERAL   3. Special screening examination for human papillomavirus (HPV)  PAP SMEAR   4.  Encounter for colorectal cancer screening  Fecal DNA Colorectal cancer screening (Cologuard)          Chief Complaint   Patient presents with    Annual Exam          Past Medical History:   Diagnosis Date    Atypical squamous cells of undetermined significance on cytologic smear of cervix (ASC-US) +HPV (other) 4/2018 8/3/2018    DVT (deep venous thrombosis) (HCC)     left leg    Essential thrombocytosis ([facility] 9/21/2020    Hx: UTI (urinary tract infection)     Phobia-Closed Spaces 5/28/2013         Patient Active Problem List    Diagnosis Date Noted    ASCUS with positive high risk HPV cervical 08/03/2018     Priority: High     8/2018 Colposcopy completed with ECC      Hx of long term use of blood thinners ([employer]) 08/03/2018     Priority: High    Enlarged uterus 06/24/2013     Priority: High    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     Priority: High     Coumadin Dosing      Evi filter in place 05/28/2013     Priority: High    Pelvic mass in female 05/28/2013     Priority: High     Recent sono without mass      Phobia-Closed Spaces 05/28/2013     Priority: High    DVT (deep venous thrombosis) (HCC)      Priority: High    Essential thrombocytosis ([facility] 09/21/2020     Priority: Medium    History of endometrial ablation 04/25/2018     Priority: Medium    S/P tubal ligation 06/24/2013     Priority: Medium    Unexplained endometrial cells on cervical Pap smear >44 yo 12/06/2018          Hereditary Breast, Ovarian, Colon and Uterine Cancer screening Done. Tobacco & Secondary smoke risks reviewed; instructed on cessation and avoidance      Counseling Completed:  Preventative Health Recommendations and Follow up. The patient was informed of the recommended preventative health recommendations. 1. Annuals every year; Cytology collections per prevailing guidelines. 2. Mammograms begin every year at 37 yo if no abnormalities are found and no family history. 3. Bone density studies every 2-3 years. Begin at 73 yo. If no fracture history or osteoporosis family history. (significant). 4. Colonoscopy begin at 40 yo. Repeat every ten years if negative and no family history. 5. Calcium of 7711-3791 mg/day in split dosing  6. Vitamin D 400-800 IU/day  7.  All other preventative health recommendations will be managed by the patients Primary care physician. PLAN:  Return in about 1 year (around 2023) for annual.   Pap smear obtained. Screening mammogram ordered. cologuard ordered. Declines colonoscopy. Repeat Annual every 1 year  Cervical Cytology Evaluation begins at 24years old. If Negative Cytology, Follow-up screening per current guidelines. Mammograms every 1 year. If 37 yo and last mammogram was negative. Calcium and Vitamin D dosing reviewed. Colonoscopy screening reviewed as well as onset for bone density testing. Birth control and barrier recommendations discussed. STD counseling and prevention reviewed. Gardisil counseling completed for all patients 10-37 yo. Routine health maintenance per patients PCP. Orders Placed This Encounter   Procedures    Fecal DNA Colorectal cancer screening (Cologuard)    GUMARO DIGITAL SCREEN W OR WO CAD BILATERAL     Standing Status:   Future     Standing Expiration Date:   2023     Order Specific Question:   Reason for exam:     Answer:   screening for malignant neoplasm of breast    PAP SMEAR     Patient History:    No LMP recorded. Patient has had an ablation. OBGYN Status: Ablation  Past Surgical History:  No date:  SECTION      Comment:  x2  : CHOLECYSTECTOMY  13: ENDOMETRIAL ABLATION      Comment:  [facility]  No date: TUBAL LIGATION  2013: VENA CAVA FILTER PLACEMENT  No date: WISDOM TOOTH EXTRACTION      Social History    Tobacco Use      Smoking status: Light Tobacco Smoker        Packs/day: 0.50        Years: 20.00        Pack years: 10        Types: Cigarettes      Smokeless tobacco: Never Used      Tobacco comment: trying to quit       Standing Status:   Future     Standing Expiration Date:   2022     Order Specific Question:   Collection Type     Answer:    Thin Prep     Order Specific Question:   Prior Abnormal Pap Test     Answer:   Yes     Comments:   ASCUS +HPV other      Order Specific Question:   If Prior Abnormal, Give Date Answer:   2018     Order Specific Question:   Prior Treatment     Answer:   None Given     Order Specific Question:   Screening or Diagnostic     Answer:   Screening     Order Specific Question:   HPV Requested? Answer:   Yes     Order Specific Question:   High Risk Patient     Answer:   N/A           The patient, Arthur Boland is a 49 y.o. female, was seen with a total time spent of 20 minutes for the visit on this date of service by the E/M provider. The time component had both face to face and non face to face time spent in determining the total time component. Counseling and education regarding her diagnosis listed below and her options regarding those diagnoses were also included in determining her time component. Diagnosis Orders   1. Well woman exam with routine gynecological exam  PAP SMEAR   2. Encounter for screening mammogram for malignant neoplasm of breast  GUMARO DIGITAL SCREEN W OR WO CAD BILATERAL   3. Special screening examination for human papillomavirus (HPV)  PAP SMEAR   4. Encounter for colorectal cancer screening  Fecal DNA Colorectal cancer screening (Cologuard)        The patient had her preventative health maintenance recommendations and follow-up reviewed with her at the completion of her visit.

## 2022-06-22 DIAGNOSIS — Z11.51 SPECIAL SCREENING EXAMINATION FOR HUMAN PAPILLOMAVIRUS (HPV): ICD-10-CM

## 2022-06-22 DIAGNOSIS — Z01.419 WELL WOMAN EXAM WITH ROUTINE GYNECOLOGICAL EXAM: ICD-10-CM

## 2023-06-02 ENCOUNTER — HOSPITAL ENCOUNTER (OUTPATIENT)
Dept: WOMENS IMAGING | Age: 53
Discharge: HOME | End: 2023-06-02
Payer: COMMERCIAL

## 2023-06-02 DIAGNOSIS — Z12.31 ENCOUNTER FOR SCREENING MAMMOGRAM FOR MALIGNANT NEOPLASM OF BREAST: ICD-10-CM

## 2023-06-02 PROCEDURE — 77063 BREAST TOMOSYNTHESIS BI: CPT

## 2023-06-19 ENCOUNTER — OFFICE VISIT (OUTPATIENT)
Dept: OBGYN CLINIC | Age: 53
End: 2023-06-19
Payer: COMMERCIAL

## 2023-06-19 ENCOUNTER — HOSPITAL ENCOUNTER (OUTPATIENT)
Age: 53
Setting detail: SPECIMEN
Discharge: HOME | End: 2023-06-19

## 2023-06-19 VITALS
BODY MASS INDEX: 40.97 KG/M2 | SYSTOLIC BLOOD PRESSURE: 118 MMHG | WEIGHT: 217 LBS | DIASTOLIC BLOOD PRESSURE: 72 MMHG | HEIGHT: 61 IN

## 2023-06-19 DIAGNOSIS — Z12.11 SCREENING FOR COLON CANCER: ICD-10-CM

## 2023-06-19 DIAGNOSIS — Z11.51 SPECIAL SCREENING EXAMINATION FOR HUMAN PAPILLOMAVIRUS (HPV): ICD-10-CM

## 2023-06-19 DIAGNOSIS — Z01.419 WELL FEMALE EXAM WITH ROUTINE GYNECOLOGICAL EXAM: Primary | ICD-10-CM

## 2023-06-19 PROCEDURE — 99396 PREV VISIT EST AGE 40-64: CPT | Performed by: NURSE PRACTITIONER

## 2023-06-19 RX ORDER — CITALOPRAM 10 MG/1
10 TABLET ORAL EVERY MORNING
COMMUNITY
Start: 2023-06-01

## 2023-06-19 ASSESSMENT — PATIENT HEALTH QUESTIONNAIRE - PHQ9
2. FEELING DOWN, DEPRESSED OR HOPELESS: 0
1. LITTLE INTEREST OR PLEASURE IN DOING THINGS: 0
SUM OF ALL RESPONSES TO PHQ QUESTIONS 1-9: 0
SUM OF ALL RESPONSES TO PHQ9 QUESTIONS 1 & 2: 0
SUM OF ALL RESPONSES TO PHQ QUESTIONS 1-9: 0

## 2023-06-19 NOTE — PROGRESS NOTES
History and Physical  [address], [address] [address], [address] [facility] [phone]   Fax [phone]  Carmelina Amaya  2023              47 y.o. Chief Complaint   Patient presents with    Annual Exam       No LMP recorded. Patient has had an ablation. Primary Care Physician: YAIMA Samuel CNP    The patient was seen and examined. She has no chief complaint today and is here for her annual exam.  Her bowels are regular. There are no voiding complaints. She denies any bloating. She denies vaginal discharge and was counseled on STD's and the need for barrier contraception. HPI : Carmelina Amaya is a 47 y.o. female [id number]    Annual exam  No chief complaint. Hx of endometrial ablation in . Does not have periods or bleeding since ablation.   ________________________________________________________________________  [place] History    Para Term  AB Living   4 4 4 0 0 4   SAB IAB Ectopic Molar Multiple Live Births   0 0 0 0 0 4      # Outcome Date GA Lbr Kush/2nd Weight Sex Delivery Anes PTL Lv   4 Term     F    KUNAL   3 Term     F    KUNAL   2 Term     M CS-LTranv   KUNAL   1 Term     F CS-LTranv   KUNAL     Past Medical History:   Diagnosis Date    Atypical squamous cells of undetermined significance on cytologic smear of cervix (ASC-US) +HPV (other) 2018 8/3/2018    DVT (deep venous thrombosis) ([facility])     left leg    Essential thrombocytosis ([address] 2020    Hx: UTI (urinary tract infection)     Phobia-Closed Spaces 2013                                                                   Past Surgical History:   Procedure Laterality Date     SECTION      x2    CHOLECYSTECTOMY      ENDOMETRIAL ABLATION  13    [facility]    TUBAL LIGATION      VENA CAVA FILTER PLACEMENT  2013    WISDOM TOOTH EXTRACTION       Family History   Problem Relation Age of Onset    Diabetes Mother     Diabetes Father

## 2023-06-23 LAB — CYTOLOGY REPORT: NORMAL

## 2024-12-23 ENCOUNTER — HOSPITAL ENCOUNTER (OUTPATIENT)
Dept: WOMENS IMAGING | Age: 54
Discharge: HOME | End: 2024-12-25
Payer: COMMERCIAL

## 2024-12-23 VITALS — WEIGHT: 215 LBS | BODY MASS INDEX: 39.56 KG/M2 | HEIGHT: 62 IN

## 2024-12-23 DIAGNOSIS — Z12.31 VISIT FOR SCREENING MAMMOGRAM: ICD-10-CM

## 2024-12-23 PROCEDURE — 77067 SCR MAMMO BI INCL CAD: CPT
